# Patient Record
Sex: FEMALE | Race: WHITE | NOT HISPANIC OR LATINO | Employment: UNEMPLOYED | URBAN - METROPOLITAN AREA
[De-identification: names, ages, dates, MRNs, and addresses within clinical notes are randomized per-mention and may not be internally consistent; named-entity substitution may affect disease eponyms.]

---

## 2024-06-07 ENCOUNTER — HOSPITAL ENCOUNTER (INPATIENT)
Facility: HOSPITAL | Age: 54
LOS: 2 days | Discharge: LEFT BEFORE DISCHARGE | DRG: 371 | End: 2024-06-09
Attending: INTERNAL MEDICINE | Admitting: INTERNAL MEDICINE
Payer: COMMERCIAL

## 2024-06-07 PROBLEM — N15.1 PERINEPHRIC ABSCESS: Status: ACTIVE | Noted: 2024-06-07

## 2024-06-07 PROBLEM — N39.0 UTI (URINARY TRACT INFECTION): Status: ACTIVE | Noted: 2024-06-07

## 2024-06-07 PROBLEM — E86.0 DEHYDRATION: Status: ACTIVE | Noted: 2024-06-07

## 2024-06-07 PROBLEM — R19.7 DIARRHEA: Status: ACTIVE | Noted: 2024-06-07

## 2024-06-07 PROBLEM — E11.9 DIABETES MELLITUS, NEW ONSET: Status: ACTIVE | Noted: 2024-06-07

## 2024-06-07 PROBLEM — E87.6 HYPOKALEMIA: Status: ACTIVE | Noted: 2024-06-07

## 2024-06-07 LAB
ALBUMIN SERPL-MCNC: 3.1 G/DL (ref 3.5–5.2)
ALBUMIN/GLOB SERPL: 0.7 G/DL
ALP SERPL-CCNC: 118 U/L (ref 39–117)
ALT SERPL W P-5'-P-CCNC: 11 U/L (ref 1–33)
ANION GAP SERPL CALCULATED.3IONS-SCNC: 18.6 MMOL/L (ref 5–15)
AST SERPL-CCNC: 14 U/L (ref 1–32)
BACTERIA UR QL AUTO: ABNORMAL /HPF
BASOPHILS # BLD AUTO: 0.07 10*3/MM3 (ref 0–0.2)
BASOPHILS NFR BLD AUTO: 0.5 % (ref 0–1.5)
BILIRUB SERPL-MCNC: 0.4 MG/DL (ref 0–1.2)
BILIRUB UR QL STRIP: NEGATIVE
BUN SERPL-MCNC: 6 MG/DL (ref 6–20)
BUN/CREAT SERPL: 9.2 (ref 7–25)
CALCIUM SPEC-SCNC: 8.8 MG/DL (ref 8.6–10.5)
CHLORIDE SERPL-SCNC: 96 MMOL/L (ref 98–107)
CLARITY UR: CLEAR
CO2 SERPL-SCNC: 22.4 MMOL/L (ref 22–29)
COLOR UR: YELLOW
CREAT SERPL-MCNC: 0.65 MG/DL (ref 0.57–1)
D-LACTATE SERPL-SCNC: 1.6 MMOL/L (ref 0.5–2)
DEPRECATED RDW RBC AUTO: 36.5 FL (ref 37–54)
EGFRCR SERPLBLD CKD-EPI 2021: 103.5 ML/MIN/1.73
EOSINOPHIL # BLD AUTO: 0.11 10*3/MM3 (ref 0–0.4)
EOSINOPHIL NFR BLD AUTO: 0.7 % (ref 0.3–6.2)
ERYTHROCYTE [DISTWIDTH] IN BLOOD BY AUTOMATED COUNT: 11.5 % (ref 12.3–15.4)
GLOBULIN UR ELPH-MCNC: 4.6 GM/DL
GLUCOSE BLDC GLUCOMTR-MCNC: 234 MG/DL (ref 70–130)
GLUCOSE BLDC GLUCOMTR-MCNC: 303 MG/DL (ref 70–130)
GLUCOSE BLDC GLUCOMTR-MCNC: 314 MG/DL (ref 70–130)
GLUCOSE SERPL-MCNC: 282 MG/DL (ref 65–99)
GLUCOSE UR STRIP-MCNC: ABNORMAL MG/DL
HBA1C MFR BLD: 14.4 % (ref 4.8–5.6)
HCT VFR BLD AUTO: 38.8 % (ref 34–46.6)
HGB BLD-MCNC: 12.4 G/DL (ref 12–15.9)
HGB UR QL STRIP.AUTO: ABNORMAL
HYALINE CASTS UR QL AUTO: ABNORMAL /LPF
IMM GRANULOCYTES # BLD AUTO: 0.11 10*3/MM3 (ref 0–0.05)
IMM GRANULOCYTES NFR BLD AUTO: 0.7 % (ref 0–0.5)
KETONES UR QL STRIP: ABNORMAL
LEUKOCYTE ESTERASE UR QL STRIP.AUTO: ABNORMAL
LYMPHOCYTES # BLD AUTO: 1.74 10*3/MM3 (ref 0.7–3.1)
LYMPHOCYTES NFR BLD AUTO: 11.3 % (ref 19.6–45.3)
MAGNESIUM SERPL-MCNC: 1.8 MG/DL (ref 1.6–2.6)
MCH RBC QN AUTO: 27.7 PG (ref 26.6–33)
MCHC RBC AUTO-ENTMCNC: 32 G/DL (ref 31.5–35.7)
MCV RBC AUTO: 86.8 FL (ref 79–97)
MONOCYTES # BLD AUTO: 0.75 10*3/MM3 (ref 0.1–0.9)
MONOCYTES NFR BLD AUTO: 4.9 % (ref 5–12)
NEUTROPHILS NFR BLD AUTO: 12.59 10*3/MM3 (ref 1.7–7)
NEUTROPHILS NFR BLD AUTO: 81.9 % (ref 42.7–76)
NITRITE UR QL STRIP: NEGATIVE
NRBC BLD AUTO-RTO: 0 /100 WBC (ref 0–0.2)
PH UR STRIP.AUTO: 6.5 [PH] (ref 5–8)
PLATELET # BLD AUTO: 451 10*3/MM3 (ref 140–450)
PMV BLD AUTO: 10.8 FL (ref 6–12)
POTASSIUM SERPL-SCNC: 2.7 MMOL/L (ref 3.5–5.2)
PROT SERPL-MCNC: 7.7 G/DL (ref 6–8.5)
PROT UR QL STRIP: ABNORMAL
RBC # BLD AUTO: 4.47 10*6/MM3 (ref 3.77–5.28)
RBC # UR STRIP: ABNORMAL /HPF
REF LAB TEST METHOD: ABNORMAL
SODIUM SERPL-SCNC: 137 MMOL/L (ref 136–145)
SP GR UR STRIP: 1.01 (ref 1–1.03)
SQUAMOUS #/AREA URNS HPF: ABNORMAL /HPF
UROBILINOGEN UR QL STRIP: ABNORMAL
WBC # UR STRIP: ABNORMAL /HPF
WBC NRBC COR # BLD AUTO: 15.37 10*3/MM3 (ref 3.4–10.8)

## 2024-06-07 PROCEDURE — 87086 URINE CULTURE/COLONY COUNT: CPT | Performed by: INTERNAL MEDICINE

## 2024-06-07 PROCEDURE — 81001 URINALYSIS AUTO W/SCOPE: CPT | Performed by: INTERNAL MEDICINE

## 2024-06-07 PROCEDURE — 85025 COMPLETE CBC W/AUTO DIFF WBC: CPT | Performed by: INTERNAL MEDICINE

## 2024-06-07 PROCEDURE — 83605 ASSAY OF LACTIC ACID: CPT | Performed by: INTERNAL MEDICINE

## 2024-06-07 PROCEDURE — 82948 REAGENT STRIP/BLOOD GLUCOSE: CPT

## 2024-06-07 PROCEDURE — 25010000002 CEFTRIAXONE PER 250 MG: Performed by: INTERNAL MEDICINE

## 2024-06-07 PROCEDURE — 80053 COMPREHEN METABOLIC PANEL: CPT | Performed by: INTERNAL MEDICINE

## 2024-06-07 PROCEDURE — 83735 ASSAY OF MAGNESIUM: CPT | Performed by: INTERNAL MEDICINE

## 2024-06-07 PROCEDURE — 83036 HEMOGLOBIN GLYCOSYLATED A1C: CPT | Performed by: INTERNAL MEDICINE

## 2024-06-07 PROCEDURE — 63710000001 INSULIN GLARGINE PER 5 UNITS: Performed by: INTERNAL MEDICINE

## 2024-06-07 PROCEDURE — 63710000001 INSULIN LISPRO (HUMAN) PER 5 UNITS: Performed by: INTERNAL MEDICINE

## 2024-06-07 PROCEDURE — 25010000002 SODIUM CHLORIDE 0.9 % WITH KCL 20 MEQ 20-0.9 MEQ/L-% SOLUTION: Performed by: INTERNAL MEDICINE

## 2024-06-07 PROCEDURE — 87040 BLOOD CULTURE FOR BACTERIA: CPT | Performed by: INTERNAL MEDICINE

## 2024-06-07 RX ORDER — NALOXONE HCL 0.4 MG/ML
0.4 VIAL (ML) INJECTION
Status: DISCONTINUED | OUTPATIENT
Start: 2024-06-07 | End: 2024-06-09 | Stop reason: HOSPADM

## 2024-06-07 RX ORDER — ACETAMINOPHEN 650 MG/1
650 SUPPOSITORY RECTAL EVERY 4 HOURS PRN
Status: DISCONTINUED | OUTPATIENT
Start: 2024-06-07 | End: 2024-06-09 | Stop reason: HOSPADM

## 2024-06-07 RX ORDER — INSULIN LISPRO 100 [IU]/ML
2-9 INJECTION, SOLUTION INTRAVENOUS; SUBCUTANEOUS
Status: DISCONTINUED | OUTPATIENT
Start: 2024-06-07 | End: 2024-06-09 | Stop reason: HOSPADM

## 2024-06-07 RX ORDER — NITROGLYCERIN 0.4 MG/1
0.4 TABLET SUBLINGUAL
Status: DISCONTINUED | OUTPATIENT
Start: 2024-06-07 | End: 2024-06-09 | Stop reason: HOSPADM

## 2024-06-07 RX ORDER — BISACODYL 10 MG
10 SUPPOSITORY, RECTAL RECTAL DAILY PRN
Status: DISCONTINUED | OUTPATIENT
Start: 2024-06-07 | End: 2024-06-09 | Stop reason: HOSPADM

## 2024-06-07 RX ORDER — SODIUM CHLORIDE 0.9 % (FLUSH) 0.9 %
10 SYRINGE (ML) INJECTION EVERY 12 HOURS SCHEDULED
Status: DISCONTINUED | OUTPATIENT
Start: 2024-06-07 | End: 2024-06-09 | Stop reason: HOSPADM

## 2024-06-07 RX ORDER — DEXTROSE MONOHYDRATE 25 G/50ML
25 INJECTION, SOLUTION INTRAVENOUS
Status: DISCONTINUED | OUTPATIENT
Start: 2024-06-07 | End: 2024-06-09 | Stop reason: HOSPADM

## 2024-06-07 RX ORDER — ACETAMINOPHEN 325 MG/1
650 TABLET ORAL EVERY 4 HOURS PRN
Status: DISCONTINUED | OUTPATIENT
Start: 2024-06-07 | End: 2024-06-09 | Stop reason: HOSPADM

## 2024-06-07 RX ORDER — MORPHINE SULFATE 2 MG/ML
2 INJECTION, SOLUTION INTRAMUSCULAR; INTRAVENOUS EVERY 4 HOURS PRN
Status: DISCONTINUED | OUTPATIENT
Start: 2024-06-07 | End: 2024-06-09 | Stop reason: HOSPADM

## 2024-06-07 RX ORDER — AMOXICILLIN 250 MG
2 CAPSULE ORAL 2 TIMES DAILY PRN
Status: DISCONTINUED | OUTPATIENT
Start: 2024-06-07 | End: 2024-06-09 | Stop reason: HOSPADM

## 2024-06-07 RX ORDER — SODIUM CHLORIDE 0.9 % (FLUSH) 0.9 %
10 SYRINGE (ML) INJECTION AS NEEDED
Status: DISCONTINUED | OUTPATIENT
Start: 2024-06-07 | End: 2024-06-09 | Stop reason: HOSPADM

## 2024-06-07 RX ORDER — BISACODYL 5 MG/1
5 TABLET, DELAYED RELEASE ORAL DAILY PRN
Status: DISCONTINUED | OUTPATIENT
Start: 2024-06-07 | End: 2024-06-09 | Stop reason: HOSPADM

## 2024-06-07 RX ORDER — NICOTINE POLACRILEX 4 MG
15 LOZENGE BUCCAL
Status: DISCONTINUED | OUTPATIENT
Start: 2024-06-07 | End: 2024-06-09 | Stop reason: HOSPADM

## 2024-06-07 RX ORDER — SODIUM CHLORIDE AND POTASSIUM CHLORIDE 150; 900 MG/100ML; MG/100ML
75 INJECTION, SOLUTION INTRAVENOUS CONTINUOUS
Status: DISCONTINUED | OUTPATIENT
Start: 2024-06-07 | End: 2024-06-09 | Stop reason: HOSPADM

## 2024-06-07 RX ORDER — SODIUM CHLORIDE 9 MG/ML
40 INJECTION, SOLUTION INTRAVENOUS AS NEEDED
Status: DISCONTINUED | OUTPATIENT
Start: 2024-06-07 | End: 2024-06-09 | Stop reason: HOSPADM

## 2024-06-07 RX ORDER — IBUPROFEN 600 MG/1
1 TABLET ORAL
Status: DISCONTINUED | OUTPATIENT
Start: 2024-06-07 | End: 2024-06-09 | Stop reason: HOSPADM

## 2024-06-07 RX ORDER — INSULIN LISPRO 100 [IU]/ML
5 INJECTION, SOLUTION INTRAVENOUS; SUBCUTANEOUS
Status: DISCONTINUED | OUTPATIENT
Start: 2024-06-07 | End: 2024-06-09 | Stop reason: HOSPADM

## 2024-06-07 RX ORDER — POLYETHYLENE GLYCOL 3350 17 G/17G
17 POWDER, FOR SOLUTION ORAL DAILY PRN
Status: DISCONTINUED | OUTPATIENT
Start: 2024-06-07 | End: 2024-06-09 | Stop reason: HOSPADM

## 2024-06-07 RX ORDER — ACETAMINOPHEN 160 MG/5ML
650 SOLUTION ORAL EVERY 4 HOURS PRN
Status: DISCONTINUED | OUTPATIENT
Start: 2024-06-07 | End: 2024-06-09 | Stop reason: HOSPADM

## 2024-06-07 RX ORDER — ACETAMINOPHEN 325 MG/1
650 TABLET ORAL EVERY 4 HOURS PRN
COMMUNITY

## 2024-06-07 RX ORDER — POTASSIUM CHLORIDE 750 MG/1
40 TABLET, FILM COATED, EXTENDED RELEASE ORAL EVERY 4 HOURS
Status: COMPLETED | OUTPATIENT
Start: 2024-06-07 | End: 2024-06-08

## 2024-06-07 RX ORDER — ONDANSETRON 2 MG/ML
4 INJECTION INTRAMUSCULAR; INTRAVENOUS EVERY 6 HOURS PRN
Status: DISCONTINUED | OUTPATIENT
Start: 2024-06-07 | End: 2024-06-09 | Stop reason: HOSPADM

## 2024-06-07 RX ADMIN — CEFTRIAXONE SODIUM 1000 MG: 1 INJECTION, POWDER, FOR SOLUTION INTRAMUSCULAR; INTRAVENOUS at 17:44

## 2024-06-07 RX ADMIN — POTASSIUM CHLORIDE AND SODIUM CHLORIDE 125 ML/HR: 900; 150 INJECTION, SOLUTION INTRAVENOUS at 17:05

## 2024-06-07 RX ADMIN — INSULIN LISPRO 7 UNITS: 100 INJECTION, SOLUTION INTRAVENOUS; SUBCUTANEOUS at 17:44

## 2024-06-07 RX ADMIN — Medication 10 ML: at 21:50

## 2024-06-07 RX ADMIN — POTASSIUM CHLORIDE 40 MEQ: 750 TABLET, EXTENDED RELEASE ORAL at 21:50

## 2024-06-07 RX ADMIN — INSULIN LISPRO 4 UNITS: 100 INJECTION, SOLUTION INTRAVENOUS; SUBCUTANEOUS at 21:49

## 2024-06-07 RX ADMIN — INSULIN LISPRO 5 UNITS: 100 INJECTION, SOLUTION INTRAVENOUS; SUBCUTANEOUS at 17:44

## 2024-06-07 RX ADMIN — INSULIN GLARGINE 25 UNITS: 100 INJECTION, SOLUTION SUBCUTANEOUS at 17:43

## 2024-06-07 NOTE — H&P
Internal medicine history and physical  INTERNAL MEDICINE   [facility]       Patient Identification:  Name: Nia SCALES  Age: 54 y.o.  Sex: female  :  1970  MRN: [id number]                   Primary Care Physician: Provider, No Known                               Date of admission:2024    Chief Complaint: Presented to the outside facility emergency room with 1 week history of nausea and vomiting and right-sided flank pain.    History of Present Illness:   Patient is a 54-year-old female who was otherwise healthy and does not take any medications started noticing about a month ago that she was getting increasingly thirsty and urinating a lot and progressively getting weak.  In this background a week prior to her visit to the emergency room at outside facility last night patient started having discomfort in the right side of her abdomen and back associated with increased urination and nausea and vomiting and diarrhea.  Her symptoms got worse couple days before going to the emergency room where evaluation revealed abnormal urinalysis, hyperglycemia-blood sugar of 528, low sodium level of 130 and bicarb of 26.  She was noted to have white blood cell count of 18,000 and was noted to have abnormal urinalysis consistent with UTI.  CT scan of the abdomen and pelvis showed right-sided nephric/perinephric abscess.  Patient was accepted to our facility last night for further management of hyperglycemia and hypoglycemia and perinephric abscess and UTI.  Patient arrived to our facility later this afternoon.  Patient was admitted and accepted by the team and was transferred her care to me because of the timing of her arrival and the start of my swing shift.  Patient herself feels about the same in terms of discomfort in her back nausea and feeling thirsty.  Patient denies any anyone cells having any of the symptoms at home.  Patient was brought to the emergency room at the outside facility by  her  in the private vehicle.  Patient admits to smoke about a pack per day for quite some time but denies any drinking.      Past Medical History:  History reviewed. No pertinent past medical history.  Past Surgical History:  History reviewed. No pertinent surgical history.   Home Meds:  Medications Prior to Admission   Medication Sig Dispense Refill Last Dose    acetaminophen (TYLENOL) 325 MG tablet Take 2 tablets by mouth Every 4 (Four) Hours As Needed for Mild Pain.        Current Meds:   No current facility-administered medications for this encounter.  Allergies:  No Known Allergies  Social History:   Social History     Tobacco Use    Smoking status: Every Day     Current packs/day: 1.00     Average packs/day: 1 pack/day for 42.4 years (42.4 ttl pk-yrs)     Types: Cigarettes     Start date: 1982    Smokeless tobacco: Never   Substance Use Topics    Alcohol use: Never      Family History:  Family History   Problem Relation Age of Onset    Cancer Mother     Alcohol abuse Mother           Review of Systems  See history of present illness and past medical history.   As described in the history of presenting illness.      Vitals:   /80 (BP Location: Left arm, Patient Position: Sitting)   Temp 98.1 °F (36.7 °C) (Oral)   Resp 18   I/O: No intake or output data in the 24 hours ending 06/07/24 1648  Exam:  Patient is examined using the personal protective equipment as per guidelines from infection control for this particular patient as enacted.  Hand washing was performed before and after patient interaction.  General Appearance:  Pleasant morbidly obese female who is comfortable and does not appear to be in any acute distress   Head:    Normocephalic, without obvious abnormality, atraumatic   Eyes:    PERRL, conjunctiva/corneas clear, EOM's intact, both eyes   Ears:    Normal external ear canals, both ears   Nose:   Nares normal, septum midline, mucosa normal, no drainage    or sinus tenderness   Throat:    Lips, tongue, gums normal; oral mucosa pink and moist   Neck:   Supple, symmetrical, trachea midline, no adenopathy;     thyroid:  no enlargement/tenderness/nodules; no carotid    bruit or JVD   Back:     Symmetric, no curvature, ROM normal, right CVA tenderness   Lungs:     Clear to auscultation bilaterally, respirations unlabored   Chest Wall:    No tenderness or deformity    Heart:    Regular rate and rhythm, S1 and S2 normal, no murmur, rub   or gallop   Abdomen:   Obese right CVA tenderness   Extremities:   Extremities normal, atraumatic, no cyanosis or edema   Pulses:   Pulses palpable in all extremities; symmetric all extremities   Skin: No rash noted   Neurologic: Alert and oriented and grossly nonfocal       Data Review:      I reviewed the patient's new clinical results.  Lab data at the outside facility reviewed  Active Hospital Problems    Diagnosis  POA    Diabetes mellitus, new onset [E11.9]  Unknown    Hypokalemia [E87.6]  Unknown    UTI (urinary tract infection) [N39.0]  Unknown    Perinephric abscess [N15.1]  Unknown    Dehydration [E86.0]  Unknown    Diarrhea [R19.7]  Unknown       Medical decision making/care plan: See admitting orders  New onset diabetes with hyperosmolar state with associated metabolic derangements consisting of hyponatremia and hypokalemia with dehydration-plan is to admit the patient start her on scheduled insulin regimen consisting of long-acting and Premeal insulin with sliding scale coverage along with aggressive hydration and electrolyte replacement per protocol while monitoring her for any evolving ketoacidosis.  May benefit from diabetic education.  Check hemoglobin A1c.  Right flank tenderness with reported radiographic evidence of right nephric/perinephric abscess with leukocytosis and abnormal urinalysis-plan is to continue with IV Rocephin, blood cultures and urine culture and follow-up on the studies performed at the outside facility.  Morbid obesity-nutrition  evaluation and education by diet and exercise to manage her diabetes.    Michael Jane MD   6/7/2024  16:48 EDT    Parts of this note may be an electronic transcription/translation of spoken language to printed text using the Dragon dictation system.

## 2024-06-08 ENCOUNTER — APPOINTMENT (OUTPATIENT)
Dept: MRI IMAGING | Facility: HOSPITAL | Age: 54
DRG: 371 | End: 2024-06-08
Payer: COMMERCIAL

## 2024-06-08 LAB
ALBUMIN SERPL-MCNC: 2.4 G/DL (ref 3.5–5.2)
ALBUMIN/GLOB SERPL: 0.6 G/DL
ALP SERPL-CCNC: 110 U/L (ref 39–117)
ALT SERPL W P-5'-P-CCNC: 15 U/L (ref 1–33)
ANION GAP SERPL CALCULATED.3IONS-SCNC: 13.8 MMOL/L (ref 5–15)
AST SERPL-CCNC: 15 U/L (ref 1–32)
BACTERIA SPEC AEROBE CULT: NORMAL
BASOPHILS # BLD AUTO: 0.05 10*3/MM3 (ref 0–0.2)
BASOPHILS NFR BLD AUTO: 0.3 % (ref 0–1.5)
BILIRUB SERPL-MCNC: 0.3 MG/DL (ref 0–1.2)
BUN SERPL-MCNC: 4 MG/DL (ref 6–20)
BUN/CREAT SERPL: 8.3 (ref 7–25)
CALCIUM SPEC-SCNC: 8 MG/DL (ref 8.6–10.5)
CHLORIDE SERPL-SCNC: 102 MMOL/L (ref 98–107)
CO2 SERPL-SCNC: 22.2 MMOL/L (ref 22–29)
CREAT SERPL-MCNC: 0.48 MG/DL (ref 0.57–1)
D-LACTATE SERPL-SCNC: 0.9 MMOL/L (ref 0.5–2)
DEPRECATED RDW RBC AUTO: 35.6 FL (ref 37–54)
EGFRCR SERPLBLD CKD-EPI 2021: 111.3 ML/MIN/1.73
EOSINOPHIL # BLD AUTO: 0.11 10*3/MM3 (ref 0–0.4)
EOSINOPHIL NFR BLD AUTO: 0.7 % (ref 0.3–6.2)
ERYTHROCYTE [DISTWIDTH] IN BLOOD BY AUTOMATED COUNT: 11.6 % (ref 12.3–15.4)
GLOBULIN UR ELPH-MCNC: 3.8 GM/DL
GLUCOSE BLDC GLUCOMTR-MCNC: 160 MG/DL (ref 70–130)
GLUCOSE BLDC GLUCOMTR-MCNC: 176 MG/DL (ref 70–130)
GLUCOSE BLDC GLUCOMTR-MCNC: 259 MG/DL (ref 70–130)
GLUCOSE BLDC GLUCOMTR-MCNC: 277 MG/DL (ref 70–130)
GLUCOSE SERPL-MCNC: 265 MG/DL (ref 65–99)
HCT VFR BLD AUTO: 32.7 % (ref 34–46.6)
HGB BLD-MCNC: 10.7 G/DL (ref 12–15.9)
IMM GRANULOCYTES # BLD AUTO: 0.11 10*3/MM3 (ref 0–0.05)
IMM GRANULOCYTES NFR BLD AUTO: 0.7 % (ref 0–0.5)
LYMPHOCYTES # BLD AUTO: 1.96 10*3/MM3 (ref 0.7–3.1)
LYMPHOCYTES NFR BLD AUTO: 11.8 % (ref 19.6–45.3)
MAGNESIUM SERPL-MCNC: 1.6 MG/DL (ref 1.6–2.6)
MCH RBC QN AUTO: 27.9 PG (ref 26.6–33)
MCHC RBC AUTO-ENTMCNC: 32.7 G/DL (ref 31.5–35.7)
MCV RBC AUTO: 85.4 FL (ref 79–97)
MONOCYTES # BLD AUTO: 0.98 10*3/MM3 (ref 0.1–0.9)
MONOCYTES NFR BLD AUTO: 5.9 % (ref 5–12)
NEUTROPHILS NFR BLD AUTO: 13.37 10*3/MM3 (ref 1.7–7)
NEUTROPHILS NFR BLD AUTO: 80.6 % (ref 42.7–76)
NRBC BLD AUTO-RTO: 0 /100 WBC (ref 0–0.2)
PLATELET # BLD AUTO: 404 10*3/MM3 (ref 140–450)
PMV BLD AUTO: 10.4 FL (ref 6–12)
POTASSIUM SERPL-SCNC: 2.7 MMOL/L (ref 3.5–5.2)
POTASSIUM SERPL-SCNC: 2.9 MMOL/L (ref 3.5–5.2)
POTASSIUM SERPL-SCNC: 3.5 MMOL/L (ref 3.5–5.2)
PROT SERPL-MCNC: 6.2 G/DL (ref 6–8.5)
RBC # BLD AUTO: 3.83 10*6/MM3 (ref 3.77–5.28)
SODIUM SERPL-SCNC: 138 MMOL/L (ref 136–145)
WBC NRBC COR # BLD AUTO: 16.58 10*3/MM3 (ref 3.4–10.8)

## 2024-06-08 PROCEDURE — 85025 COMPLETE CBC W/AUTO DIFF WBC: CPT | Performed by: INTERNAL MEDICINE

## 2024-06-08 PROCEDURE — 80053 COMPREHEN METABOLIC PANEL: CPT | Performed by: INTERNAL MEDICINE

## 2024-06-08 PROCEDURE — 25010000002 SODIUM CHLORIDE 0.9 % WITH KCL 20 MEQ 20-0.9 MEQ/L-% SOLUTION: Performed by: HOSPITALIST

## 2024-06-08 PROCEDURE — 82948 REAGENT STRIP/BLOOD GLUCOSE: CPT

## 2024-06-08 PROCEDURE — 83735 ASSAY OF MAGNESIUM: CPT | Performed by: INTERNAL MEDICINE

## 2024-06-08 PROCEDURE — 25010000002 SODIUM CHLORIDE 0.9 % WITH KCL 20 MEQ 20-0.9 MEQ/L-% SOLUTION: Performed by: INTERNAL MEDICINE

## 2024-06-08 PROCEDURE — 84132 ASSAY OF SERUM POTASSIUM: CPT | Performed by: INTERNAL MEDICINE

## 2024-06-08 PROCEDURE — 63710000001 INSULIN GLARGINE PER 5 UNITS: Performed by: INTERNAL MEDICINE

## 2024-06-08 PROCEDURE — 25010000002 CEFTRIAXONE PER 250 MG: Performed by: HOSPITALIST

## 2024-06-08 PROCEDURE — 63710000001 INSULIN LISPRO (HUMAN) PER 5 UNITS: Performed by: INTERNAL MEDICINE

## 2024-06-08 PROCEDURE — 83605 ASSAY OF LACTIC ACID: CPT | Performed by: INTERNAL MEDICINE

## 2024-06-08 RX ORDER — POTASSIUM CHLORIDE 750 MG/1
40 TABLET, FILM COATED, EXTENDED RELEASE ORAL EVERY 4 HOURS
Status: COMPLETED | OUTPATIENT
Start: 2024-06-08 | End: 2024-06-09

## 2024-06-08 RX ADMIN — CEFTRIAXONE 2000 MG: 2 INJECTION, POWDER, FOR SOLUTION INTRAMUSCULAR; INTRAVENOUS at 17:29

## 2024-06-08 RX ADMIN — INSULIN LISPRO 6 UNITS: 100 INJECTION, SOLUTION INTRAVENOUS; SUBCUTANEOUS at 08:27

## 2024-06-08 RX ADMIN — INSULIN LISPRO 6 UNITS: 100 INJECTION, SOLUTION INTRAVENOUS; SUBCUTANEOUS at 17:29

## 2024-06-08 RX ADMIN — POTASSIUM CHLORIDE AND SODIUM CHLORIDE 75 ML/HR: 900; 150 INJECTION, SOLUTION INTRAVENOUS at 23:40

## 2024-06-08 RX ADMIN — ACETAMINOPHEN 325MG 650 MG: 325 TABLET ORAL at 04:10

## 2024-06-08 RX ADMIN — POTASSIUM CHLORIDE 40 MEQ: 750 TABLET, EXTENDED RELEASE ORAL at 23:35

## 2024-06-08 RX ADMIN — POTASSIUM CHLORIDE 40 MEQ: 750 TABLET, EXTENDED RELEASE ORAL at 02:03

## 2024-06-08 RX ADMIN — INSULIN LISPRO 2 UNITS: 100 INJECTION, SOLUTION INTRAVENOUS; SUBCUTANEOUS at 20:11

## 2024-06-08 RX ADMIN — POTASSIUM CHLORIDE 40 MEQ: 750 TABLET, EXTENDED RELEASE ORAL at 19:59

## 2024-06-08 RX ADMIN — Medication 10 ML: at 20:02

## 2024-06-08 RX ADMIN — POTASSIUM CHLORIDE AND SODIUM CHLORIDE 75 ML/HR: 900; 150 INJECTION, SOLUTION INTRAVENOUS at 09:48

## 2024-06-08 RX ADMIN — POTASSIUM CHLORIDE AND SODIUM CHLORIDE 125 ML/HR: 900; 150 INJECTION, SOLUTION INTRAVENOUS at 02:03

## 2024-06-08 RX ADMIN — POTASSIUM CHLORIDE 40 MEQ: 750 TABLET, EXTENDED RELEASE ORAL at 05:33

## 2024-06-08 RX ADMIN — INSULIN LISPRO 2 UNITS: 100 INJECTION, SOLUTION INTRAVENOUS; SUBCUTANEOUS at 12:05

## 2024-06-08 RX ADMIN — Medication 10 ML: at 08:27

## 2024-06-08 RX ADMIN — INSULIN LISPRO 5 UNITS: 100 INJECTION, SOLUTION INTRAVENOUS; SUBCUTANEOUS at 08:27

## 2024-06-08 RX ADMIN — INSULIN GLARGINE 25 UNITS: 100 INJECTION, SOLUTION SUBCUTANEOUS at 20:11

## 2024-06-08 RX ADMIN — INSULIN LISPRO 5 UNITS: 100 INJECTION, SOLUTION INTRAVENOUS; SUBCUTANEOUS at 17:29

## 2024-06-08 RX ADMIN — ACETAMINOPHEN 325MG 650 MG: 325 TABLET ORAL at 23:35

## 2024-06-08 RX ADMIN — INSULIN LISPRO 5 UNITS: 100 INJECTION, SOLUTION INTRAVENOUS; SUBCUTANEOUS at 12:05

## 2024-06-08 NOTE — PLAN OF CARE
Goal Outcome Evaluation:  Plan of Care Reviewed With: patient        Progress: no change  Outcome Evaluation: Pt A&Ox4. VSS. IV fluids infusing. K replaced per protocol. Plan of care continues.

## 2024-06-08 NOTE — PROGRESS NOTES
Dedicated to Hospital Care    [phone]   LOS: 1 day     Name: Nia SCALES  Age/Sex: 54 y.o. female  :  1970        PCP: Provider, No Known  No chief complaint on file.     Subjective   Complains of pain in her right hip and low back as well as right flank.  She is requesting to take a shower.  Denies other new issues or complaints this morning.  General: No Fever or Chills, Cardiac: No Chest Pain or Palpitations, Resp: No Cough or SOA, GI: No Nausea, Vomiting, or Diarrhea, and Other: No bleeding    cefTRIAXone, 2,000 mg, Intravenous, Q24H  insulin glargine, 25 Units, Subcutaneous, Nightly  insulin lispro, 2-9 Units, Subcutaneous, 4x Daily AC & at Bedtime  insulin lispro, 5 Units, Subcutaneous, TID With Meals  sodium chloride, 10 mL, Intravenous, Q12H      sodium chloride 0.9 % with KCl 20 mEq, 75 mL/hr, Last Rate: 75 mL/hr (24 0948)        Objective   Vital Signs  Temp:  [97.7 °F (36.5 °C)-100.2 °F (37.9 °C)] 98.2 °F (36.8 °C)  Heart Rate:  [] 92  Resp:  [16-20] 20  BP: (147-156)/(61-80) 147/73  Body mass index is 32.02 kg/m².    Intake/Output Summary (Last 24 hours) at 2024 1246  Last data filed at 2024 1724  Gross per 24 hour   Intake --   Output 100 ml   Net -100 ml       Physical Exam  Vitals and nursing note reviewed.   Constitutional:       General: She is not in acute distress.     Appearance: She is ill-appearing.   Cardiovascular:      Rate and Rhythm: Normal rate and regular rhythm.   Pulmonary:      Effort: No respiratory distress.      Breath sounds: Normal breath sounds.   Neurological:      General: No focal deficit present.      Mental Status: She is alert. Mental status is at baseline.           Results Review:       I reviewed the patient's new clinical results.  Results from last 7 days   Lab Units 24  0537 24  1706   WBC 10*3/mm3 16.58* 15.37*   HEMOGLOBIN g/dL 10.7* 12.4   PLATELETS 10*3/mm3 404 451*     Results from last 7 days   Lab Units  06/08/24  1014 06/08/24  0537 06/07/24  1706   SODIUM mmol/L  --  138 137   POTASSIUM mmol/L 3.5 2.9* 2.7*   CHLORIDE mmol/L  --  102 96*   CO2 mmol/L  --  22.2 22.4   BUN mg/dL  --  4* 6   CREATININE mg/dL  --  0.48* 0.65   CALCIUM mg/dL  --  8.0* 8.8   MAGNESIUM mg/dL  --  1.6 1.8   Estimated Creatinine Clearance: 147.9 mL/min (A) (by C-G formula based on SCr of 0.48 mg/dL (L)).      Assessment & Plan   Active Hospital Problems    Diagnosis  POA    **Perinephric abscess [N15.1]  Unknown    Diabetes mellitus, new onset [E11.9]  Unknown    Hypokalemia [E87.6]  Unknown    UTI (urinary tract infection) [N39.0]  Unknown    Dehydration [E86.0]  Unknown    Diarrhea [R19.7]  Unknown      Resolved Hospital Problems   No resolved problems to display.       PLAN  This is a 54-year-old lady with lack of significant past medical history who presents to the hospital with flank pain and back pain is found to have new onset diabetes as well as possible perinephric abscess  -Urology's been consulted we will follow-up their recommendations and follow-up additional imaging if required.  -Replace potassium per protocol  -Continue IV Rocephin but increase dose to 2 g given the concern for abscess  -Follow-up urine and blood cultures  -Decrease IV fluids  -Okay to shower  -Mechanical DVT prophylaxis  -Full code      Disposition  Expected discharge date/ time has not been documented.       Efren George MD  [facility]  06/08/24  12:46 EDT

## 2024-06-08 NOTE — PLAN OF CARE
Goal Outcome Evaluation:  Plan of Care Reviewed With: patient        Progress: no change  Outcome Evaluation: Pt educated about diabetes, checking blood glucose and complications of the disease; Pt took a shower; MRI form was completed and faxed; Pt IV fluids decreased; IV abx given; New IV placed and other two IVs removed. pt also educated about telemetry unit and a basic plan of care in making sure her BG levels were better controlled.

## 2024-06-09 ENCOUNTER — APPOINTMENT (OUTPATIENT)
Dept: CT IMAGING | Facility: HOSPITAL | Age: 54
DRG: 371 | End: 2024-06-09
Payer: COMMERCIAL

## 2024-06-09 VITALS
RESPIRATION RATE: 22 BRPM | BODY MASS INDEX: 31.89 KG/M2 | DIASTOLIC BLOOD PRESSURE: 68 MMHG | HEART RATE: 90 BPM | OXYGEN SATURATION: 97 % | HEIGHT: 66 IN | TEMPERATURE: 99.1 F | WEIGHT: 198.41 LBS | SYSTOLIC BLOOD PRESSURE: 140 MMHG

## 2024-06-09 LAB
ALBUMIN SERPL-MCNC: 2.5 G/DL (ref 3.5–5.2)
ANION GAP SERPL CALCULATED.3IONS-SCNC: 12.2 MMOL/L (ref 5–15)
BASOPHILS # BLD AUTO: 0.04 10*3/MM3 (ref 0–0.2)
BASOPHILS NFR BLD AUTO: 0.2 % (ref 0–1.5)
BUN SERPL-MCNC: 4 MG/DL (ref 6–20)
BUN/CREAT SERPL: 7.3 (ref 7–25)
CALCIUM SPEC-SCNC: 8 MG/DL (ref 8.6–10.5)
CHLORIDE SERPL-SCNC: 101 MMOL/L (ref 98–107)
CO2 SERPL-SCNC: 21.8 MMOL/L (ref 22–29)
CREAT SERPL-MCNC: 0.55 MG/DL (ref 0.57–1)
DEPRECATED RDW RBC AUTO: 37.1 FL (ref 37–54)
EGFRCR SERPLBLD CKD-EPI 2021: 107.7 ML/MIN/1.73
EOSINOPHIL # BLD AUTO: 0.18 10*3/MM3 (ref 0–0.4)
EOSINOPHIL NFR BLD AUTO: 1.1 % (ref 0.3–6.2)
ERYTHROCYTE [DISTWIDTH] IN BLOOD BY AUTOMATED COUNT: 11.8 % (ref 12.3–15.4)
GLUCOSE BLDC GLUCOMTR-MCNC: 193 MG/DL (ref 70–130)
GLUCOSE BLDC GLUCOMTR-MCNC: 244 MG/DL (ref 70–130)
GLUCOSE SERPL-MCNC: 177 MG/DL (ref 65–99)
HCT VFR BLD AUTO: 31 % (ref 34–46.6)
HGB BLD-MCNC: 10.1 G/DL (ref 12–15.9)
IMM GRANULOCYTES # BLD AUTO: 0.12 10*3/MM3 (ref 0–0.05)
IMM GRANULOCYTES NFR BLD AUTO: 0.7 % (ref 0–0.5)
LYMPHOCYTES # BLD AUTO: 2.05 10*3/MM3 (ref 0.7–3.1)
LYMPHOCYTES NFR BLD AUTO: 12.8 % (ref 19.6–45.3)
MAGNESIUM SERPL-MCNC: 1.4 MG/DL (ref 1.6–2.6)
MCH RBC QN AUTO: 28 PG (ref 26.6–33)
MCHC RBC AUTO-ENTMCNC: 32.6 G/DL (ref 31.5–35.7)
MCV RBC AUTO: 85.9 FL (ref 79–97)
MONOCYTES # BLD AUTO: 0.94 10*3/MM3 (ref 0.1–0.9)
MONOCYTES NFR BLD AUTO: 5.9 % (ref 5–12)
NEUTROPHILS NFR BLD AUTO: 12.7 10*3/MM3 (ref 1.7–7)
NEUTROPHILS NFR BLD AUTO: 79.3 % (ref 42.7–76)
NRBC BLD AUTO-RTO: 0 /100 WBC (ref 0–0.2)
PHOSPHATE SERPL-MCNC: 2.2 MG/DL (ref 2.5–4.5)
PLATELET # BLD AUTO: 332 10*3/MM3 (ref 140–450)
PMV BLD AUTO: 10 FL (ref 6–12)
POTASSIUM SERPL-SCNC: 3.1 MMOL/L (ref 3.5–5.2)
POTASSIUM SERPL-SCNC: 3.1 MMOL/L (ref 3.5–5.2)
POTASSIUM SERPL-SCNC: 3.4 MMOL/L (ref 3.5–5.2)
RBC # BLD AUTO: 3.61 10*6/MM3 (ref 3.77–5.28)
SODIUM SERPL-SCNC: 135 MMOL/L (ref 136–145)
WBC NRBC COR # BLD AUTO: 16.03 10*3/MM3 (ref 3.4–10.8)

## 2024-06-09 PROCEDURE — 85025 COMPLETE CBC W/AUTO DIFF WBC: CPT | Performed by: HOSPITALIST

## 2024-06-09 PROCEDURE — 84132 ASSAY OF SERUM POTASSIUM: CPT | Performed by: HOSPITALIST

## 2024-06-09 PROCEDURE — 74178 CT ABD&PLV WO CNTR FLWD CNTR: CPT

## 2024-06-09 PROCEDURE — 63710000001 INSULIN LISPRO (HUMAN) PER 5 UNITS: Performed by: INTERNAL MEDICINE

## 2024-06-09 PROCEDURE — 82948 REAGENT STRIP/BLOOD GLUCOSE: CPT

## 2024-06-09 PROCEDURE — 25510000001 IOPAMIDOL 61 % SOLUTION: Performed by: HOSPITALIST

## 2024-06-09 PROCEDURE — 25010000002 MAGNESIUM SULFATE 2 GM/50ML SOLUTION: Performed by: HOSPITALIST

## 2024-06-09 PROCEDURE — 99223 1ST HOSP IP/OBS HIGH 75: CPT | Performed by: STUDENT IN AN ORGANIZED HEALTH CARE EDUCATION/TRAINING PROGRAM

## 2024-06-09 PROCEDURE — 80069 RENAL FUNCTION PANEL: CPT | Performed by: HOSPITALIST

## 2024-06-09 PROCEDURE — 83735 ASSAY OF MAGNESIUM: CPT | Performed by: HOSPITALIST

## 2024-06-09 RX ORDER — POTASSIUM CHLORIDE 750 MG/1
40 TABLET, FILM COATED, EXTENDED RELEASE ORAL EVERY 4 HOURS
Status: DISCONTINUED | OUTPATIENT
Start: 2024-06-09 | End: 2024-06-09 | Stop reason: HOSPADM

## 2024-06-09 RX ORDER — MAGNESIUM SULFATE HEPTAHYDRATE 40 MG/ML
2 INJECTION, SOLUTION INTRAVENOUS
Status: DISPENSED | OUTPATIENT
Start: 2024-06-09 | End: 2024-06-09

## 2024-06-09 RX ADMIN — POTASSIUM, SODIUM PHOSPHATES 280 MG-160 MG-250 MG ORAL POWDER PACKET 2 PACKET: POWDER IN PACKET at 08:07

## 2024-06-09 RX ADMIN — MAGNESIUM SULFATE HEPTAHYDRATE 2 G: 2 INJECTION, SOLUTION INTRAVENOUS at 10:52

## 2024-06-09 RX ADMIN — POTASSIUM CHLORIDE 40 MEQ: 750 TABLET, EXTENDED RELEASE ORAL at 10:52

## 2024-06-09 RX ADMIN — POTASSIUM CHLORIDE 40 MEQ: 750 TABLET, EXTENDED RELEASE ORAL at 03:34

## 2024-06-09 RX ADMIN — INSULIN LISPRO 2 UNITS: 100 INJECTION, SOLUTION INTRAVENOUS; SUBCUTANEOUS at 08:06

## 2024-06-09 RX ADMIN — INSULIN LISPRO 5 UNITS: 100 INJECTION, SOLUTION INTRAVENOUS; SUBCUTANEOUS at 08:07

## 2024-06-09 RX ADMIN — IOPAMIDOL 85 ML: 612 INJECTION, SOLUTION INTRAVENOUS at 08:27

## 2024-06-09 RX ADMIN — MAGNESIUM SULFATE HEPTAHYDRATE 2 G: 2 INJECTION, SOLUTION INTRAVENOUS at 08:07

## 2024-06-09 NOTE — NURSING NOTE
Dr. Bird called to speak to this nurse regarding need for gen-surg consult and if CT abd/pelvis with and without contrast, he had ordered, would be done tonight. I spoke with RAGHAV NAVA, with [facility], and explained Dr. Bird's recommendation and gen-surg consult was ordered. I then spoke with the CT department and expressed Dr. Bird's concerns and was assured that the pt's CT would be done overnight.

## 2024-06-09 NOTE — SIGNIFICANT NOTE
06/09/24 0913   OTHER   Discipline physical therapist   Rehab Time/Intention   Session Not Performed other (see comments)  (PT eval order received, nsg notes patient up ad viri in room without difficulty, took a shower, no indication for acute skilled PT)

## 2024-06-09 NOTE — CONSULTS
FIRST UROLOGY CONSULT      Patient Identification:  NAME:  Nia SCALES  Age:  54 y.o.   Sex:  female   :  1970   MRN:  [id number]     Chief complaint: Right flank pain, nausea and vomiting    History of present illness:      Pt is a 54 y.o. female that presented to [facility] as a direct admit on 24 for further management of her diabetes, perinephric abscess and UTI. Patient had been seen at an outside facility x 1 week ago with complaints of right sided flank pain, urinary frequency, nausea and vomiting. CT scan at outside facility showed a right nephric/perinephric abscess. Urology consulted for evaluation and management of a possible perinephric abscess. Pt denies dysuria, gross hematuria, current fever, nausea or vomiting. Reports right flank pain and urinary frequency. General surgery and ID following. General surgery recommending IR evaluation for drainage of abscess.    In hospital:  -Afebrile currently, temp 100.2-101.1 on admission, good UOP  -WBC - 16.03 (16.58)  -Creat - 0.55 (0.48)  -UA - Trace LE, negative nitrites, 3-5 RBC, 21-50 WBC, no bacteria 3-6 SE cells  -UCx - Mixed chinyere  -Blood culture - No growth    -CT - Report from outside facility - Right kidney displaced anteriorly by the retroperitoneal process. Posterior margin of the right kidney upper to interpolar indistinct with the adjacent complex collection, at least a very small component appears to be perirenal, contiguous with the psoas compartment component. Subtle heterogeneous decreased cortical enhancement of the adjacent right kidney in this region but otherwise the right kidney appears unremarkable. Left kidney unremarkable. No hydronephrosis. Small calculi in both kidneys. Complex predominantly low attenuation collection or mass in the right psoas muscle from the upper pole kidney level to the level of the iliac crest, 7.8 x 5.6 cm axial by 11.7 cm cranial caudal with partial peripheral enhancement and septations. Mild  adjacent stranding. No obvious extension into the spinal canal.       CT - Bilateral non-obstructing nephrolithiasis, large fluid collection centered in the right psoas extending into and displacing right kidney    Asked to see    Past medical history:  History reviewed. No pertinent past medical history.    Past surgical history:  History reviewed. No pertinent surgical history.    Allergies:  Patient has no known allergies.    Home medications:  Medications Prior to Admission   Medication Sig Dispense Refill Last Dose    acetaminophen (TYLENOL) 325 MG tablet Take 2 tablets by mouth Every 4 (Four) Hours As Needed for Mild Pain.           Hospital medications:  cefTRIAXone, 2,000 mg, Intravenous, Q24H  insulin glargine, 25 Units, Subcutaneous, Nightly  insulin lispro, 2-9 Units, Subcutaneous, 4x Daily AC & at Bedtime  insulin lispro, 5 Units, Subcutaneous, TID With Meals  magnesium sulfate, 2 g, Intravenous, Q2H  potassium & sodium phosphates, 2 packet, Oral, Once  sodium chloride, 10 mL, Intravenous, Q12H      sodium chloride 0.9 % with KCl 20 mEq, 75 mL/hr, Last Rate: 75 mL/hr (06/08/24 2340)        acetaminophen **OR** acetaminophen **OR** acetaminophen    senna-docusate sodium **AND** polyethylene glycol **AND** bisacodyl **AND** bisacodyl    Calcium Replacement - Follow Nurse / BPA Driven Protocol    dextrose    dextrose    glucagon (human recombinant)    Magnesium Standard Dose Replacement - Follow Nurse / BPA Driven Protocol    Morphine **AND** naloxone    nitroglycerin    ondansetron    Phosphorus Replacement - Follow Nurse / BPA Driven Protocol    Potassium Replacement - Follow Nurse / BPA Driven Protocol    sodium chloride    sodium chloride    Family history:  Family History   Problem Relation Age of Onset    Cancer Mother     Alcohol abuse Mother        Social history:  Social History     Tobacco Use    Smoking status: Every Day     Current packs/day: 1.00     Average packs/day: 1 pack/day for 42.4  years (42.4 ttl pk-yrs)     Types: Cigarettes     Start date:     Smokeless tobacco: Never   Vaping Use    Vaping status: Never Used   Substance Use Topics    Alcohol use: Never    Drug use: Never       Review of systems:      12 point negative except as in HPI    Objective:  TMax 24 hours:   Temp (24hrs), Av.5 °F (37.5 °C), Min:98.6 °F (37 °C), Max:101.1 °F (38.4 °C)      Vitals Ranges:   Temp:  [98.6 °F (37 °C)-101.1 °F (38.4 °C)] 99.1 °F (37.3 °C)  Heart Rate:  [] 90  Resp:  [18-22] 22  BP: (140-151)/(54-76) 140/68    Intake/Output Last 3 shifts:  No intake/output data recorded.     Physical Exam:    General Appearance:    Alert, cooperative, NAD, sitting at bedside   Back:     No CVA tenderness   Lungs:     Respirations unlabored, no wheezing   Abdomen:     Soft, NDNT, no masses, no guarding   :    Pelvic not performed   Neuro/Psych:   Orientation intact, mood/affect pleasant       Results review:   I reviewed the patient's new clinical results.    Data review:  Lab Results (last 24 hours)       Procedure Component Value Units Date/Time    Potassium [[id number]] Collected: 24 0753    Specimen: Blood Updated: 24 0758    POC Glucose Once [[id number]]  (Abnormal) Collected: 24    Specimen: Blood Updated: 24     Glucose 193 mg/dL     Magnesium [[id number]]  (Abnormal) Collected: 24    Specimen: Blood Updated: 24     Magnesium 1.4 mg/dL     Potassium [[id number]]  (Abnormal) Collected: 24    Specimen: Blood Updated: 24     Potassium 3.1 mmol/L     Renal Function Panel [[id number]]  (Abnormal) Collected: 24    Specimen: Blood Updated: 24     Glucose 177 mg/dL      BUN 4 mg/dL      Creatinine 0.55 mg/dL      Sodium 135 mmol/L      Potassium 3.1 mmol/L      Chloride 101 mmol/L      CO2 21.8 mmol/L      Calcium 8.0 mg/dL      Albumin 2.5 g/dL      Phosphorus 2.2 mg/dL      Anion Gap 12.2 mmol/L       BUN/Creatinine Ratio 7.3     eGFR 107.7 mL/min/1.73     Narrative:      GFR Normal >60  Chronic Kidney Disease <60  Kidney Failure <15      CBC & Differential [[id number]]  (Abnormal) Collected: 06/09/24 0332    Specimen: Blood Updated: 06/09/24 0346    Narrative:      The following orders were created for panel order CBC & Differential.  Procedure                               Abnormality         Status                     ---------                               -----------         ------                     CBC Auto Differential[[id number]]        Abnormal            Final result                 Please view results for these tests on the individual orders.    CBC Auto Differential [[id number]]  (Abnormal) Collected: 06/09/24 0332    Specimen: Blood Updated: 06/09/24 0346     WBC 16.03 10*3/mm3      RBC 3.61 10*6/mm3      Hemoglobin 10.1 g/dL      Hematocrit 31.0 %      MCV 85.9 fL      MCH 28.0 pg      MCHC 32.6 g/dL      RDW 11.8 %      RDW-SD 37.1 fl      MPV 10.0 fL      Platelets 332 10*3/mm3      Neutrophil % 79.3 %      Lymphocyte % 12.8 %      Monocyte % 5.9 %      Eosinophil % 1.1 %      Basophil % 0.2 %      Immature Grans % 0.7 %      Neutrophils, Absolute 12.70 10*3/mm3      Lymphocytes, Absolute 2.05 10*3/mm3      Monocytes, Absolute 0.94 10*3/mm3      Eosinophils, Absolute 0.18 10*3/mm3      Basophils, Absolute 0.04 10*3/mm3      Immature Grans, Absolute 0.12 10*3/mm3      nRBC 0.0 /100 WBC     POC Glucose Once [[id number]]  (Abnormal) Collected: 06/08/24 2005    Specimen: Blood Updated: 06/08/24 2006     Glucose 160 mg/dL     Urine Culture - Urine, Urine, Clean Catch [[id number]] Collected: 06/07/24 1723    Specimen: Urine, Clean Catch Updated: 06/08/24 1850     Urine Culture <10,000 CFU/mL Mixed Evonne Isolated    Narrative:      Specimen contains mixed organisms of questionable pathogenicity suggestive of contamination. If symptoms persist, suggest recollection.  Colonization of the urinary tract without  infection is common. Treatment is discouraged unless the patient is symptomatic, pregnant, or undergoing an invasive urologic procedure.    Potassium [[id number]]  (Abnormal) Collected: 06/08/24 1812    Specimen: Blood Updated: 06/08/24 1836     Potassium 2.7 mmol/L     Blood Culture - Blood, Arm, Right [[id number]]  (Normal) Collected: 06/07/24 1706    Specimen: Blood from Arm, Right Updated: 06/08/24 1832     Blood Culture No growth at 24 hours    Blood Culture - Blood, Hand, Left [[id number]]  (Normal) Collected: 06/07/24 1715    Specimen: Blood from Hand, Left Updated: 06/08/24 1832     Blood Culture No growth at 24 hours    Narrative:      Less than seven (7) mL's of blood was collected.  Insufficient quantity may yield false negative results.    POC Glucose Once [[id number]]  (Abnormal) Collected: 06/08/24 1600    Specimen: Blood Updated: 06/08/24 1601     Glucose 259 mg/dL     POC Glucose Once [[id number]]  (Abnormal) Collected: 06/08/24 1143    Specimen: Blood Updated: 06/08/24 1145     Glucose 176 mg/dL     Potassium [[id number]]  (Normal) Collected: 06/08/24 1014    Specimen: Blood Updated: 06/08/24 1044     Potassium 3.5 mmol/L              Imaging:  Imaging Results (Last 24 Hours)       ** No results found for the last 24 hours. **             Assessment:     Urinary tract infection  Perinephric abscess    Plan:   - No acute urologic surgical intervention planned  - Agree with general surgery- recommend IR evaluation for drainage of abscess  - Continue antibiotics per the recommendation of ID  - Urology will follow    BRANDY Rojo  06/09/24  08:06 EDT    Plan reviewed and discussed with Dr. Bird

## 2024-06-09 NOTE — PROGRESS NOTES
"RN messaged to report patient wanted to leave AMA. I revisited the patient and discussed with her again the recommendation for IR drainage and IV antibiotics to treat her retroperitoneal abscess. I explained the expected projection of treatment, alternatives, and the risks of leaving AMA with untreated infection including but not limited to worsening sepsis and death. During our conversation the patient demonstrated capacity to make decisions. She is adamant about leaving, stating she feels like her management is taking too long and that she has been \"strung along\" since she first arrived at her outside facility. She states that she is aware of the risks of leaving and despite these refuses to stay for treatment. Updated the nurse, IR, and Urology. Asked RN to inform primary team.    Letitia Barrera M.D.  General, Robotic, and Endoscopic Surgery  [facility]    [address], [address]  [place], [place], [address]  P: [phone]  F: [phone]     "

## 2024-06-09 NOTE — CONSULTS
General Surgery Consultation    Consulting Physician: Letitia Barrera MD  Referring: Galina Maravilla APRN    Reason for consultation:   perinephric vs psoas vs retroperitoneal abcess    CC:   Back pain    HPI:   The patient is a 54 y.o. female who presented to the hospital as a transfer from [place] for concern for possible retroperitoneal abscess versus hematoma.  The patient reports approximately 1 month history of worsening lower back pain with associated nausea, vomiting, low-grade fevers, chills, night sweats, occasional lightheadedness, and 30 pound weight loss in the past year.  She states she had some loose stools without any blood in them and notes that she has had normal urine output without any hematuria or dysuria, just states her urine is foul-smelling.  She denies associated chest pain, shortness of breath, or palpitations.      She has a history of open cholecystectomy and denies other abdominal surgeries.  She denies taking any blood thinners.  She smokes 1 to 2 packs of cigarettes per day since age 12.    Past Medical History:  Tobacco use  Obesity, BMI 32    Past Surgical History:  Open cholecystectomy for gallbladder rupture  Denies prior EGD/colonoscopy  Denies prior urinary tract instrumentation    Medications:  Medications Prior to Admission   Medication Sig Dispense Refill Last Dose    acetaminophen (TYLENOL) 325 MG tablet Take 2 tablets by mouth Every 4 (Four) Hours As Needed for Mild Pain.          Current Facility-Administered Medications:     acetaminophen (TYLENOL) tablet 650 mg, 650 mg, Oral, Q4H PRN, 650 mg at 06/08/24 2335 **OR** acetaminophen (TYLENOL) 160 MG/5ML oral solution 650 mg, 650 mg, Oral, Q4H PRN **OR** acetaminophen (TYLENOL) suppository 650 mg, 650 mg, Rectal, Q4H PRN, Michael Jane MD    sennosides-docusate (PERICOLACE) 8.6-50 MG per tablet 2 tablet, 2 tablet, Oral, BID PRN **AND** polyethylene glycol (MIRALAX) packet 17 g, 17 g, Oral, Daily PRN **AND**  bisacodyl (DULCOLAX) EC tablet 5 mg, 5 mg, Oral, Daily PRN **AND** bisacodyl (DULCOLAX) suppository 10 mg, 10 mg, Rectal, Daily PRN, Michael Jane MD    Calcium Replacement - Follow Nurse / BPA Driven Protocol, , Does not apply, PRN, Michael Jane MD    cefTRIAXone (ROCEPHIN) 2,000 mg in sodium chloride 0.9 % 100 mL MBP, 2,000 mg, Intravenous, Q24H, Efren George MD, Last Rate: 200 mL/hr at 06/08/24 1729, 2,000 mg at 06/08/24 1729    dextrose (D50W) (25 g/50 mL) IV injection 25 g, 25 g, Intravenous, Q15 Min PRN, Michael Jane MD    dextrose (GLUTOSE) oral gel 15 g, 15 g, Oral, Q15 Min PRN, Michael Jane MD    glucagon (GLUCAGEN) injection 1 mg, 1 mg, Intramuscular, Q15 Min PRN, Michael Jane MD    insulin glargine (LANTUS, SEMGLEE) injection 25 Units, 25 Units, Subcutaneous, Nightly, Michael Jane MD, 25 Units at 06/08/24 2011    insulin lispro (HUMALOG/ADMELOG) injection 2-9 Units, 2-9 Units, Subcutaneous, 4x Daily AC & at Bedtime, Michael Jane MD, 2 Units at 06/09/24 0806    insulin lispro (HUMALOG/ADMELOG) injection 5 Units, 5 Units, Subcutaneous, TID With Meals, Michael Jane MD, 5 Units at 06/09/24 0807    Magnesium Standard Dose Replacement - Follow Nurse / BPA Driven Protocol, , Does not apply, PRN, Michael Jane MD    magnesium sulfate 2g/50 mL (PREMIX) infusion, 2 g, Intravenous, Q2H, Efren George MD, 2 g at 06/09/24 0807    morphine injection 2 mg, 2 mg, Intravenous, Q4H PRN **AND** naloxone (NARCAN) injection 0.4 mg, 0.4 mg, Intravenous, Q5 Min PRN, Michael Jane MD    nitroglycerin (NITROSTAT) SL tablet 0.4 mg, 0.4 mg, Sublingual, Q5 Min PRN, Michael Jane MD    ondansetron (ZOFRAN) injection 4 mg, 4 mg, Intravenous, Q6H PRN, Michael Jane MD    Phosphorus Replacement - Follow Nurse / BPA Driven Protocol, , Does not apply, PRN, Michael Jane MD    Potassium Replacement - Follow Nurse / BPA Driven Protocol, , Does not apply, PRN, Michael Jane MD    sodium chloride 0.9 % flush 10 mL, 10  mL, Intravenous, Q12H, Michael Jane MD, 10 mL at 24    sodium chloride 0.9 % flush 10 mL, 10 mL, Intravenous, PRN, Michael Jane MD    sodium chloride 0.9 % infusion 40 mL, 40 mL, Intravenous, PRN, Michael Jane MD    sodium chloride 0.9 % with KCl 20 mEq/L infusion, 75 mL/hr, Intravenous, Continuous, Efren George MD, Last Rate: 75 mL/hr at 24, 75 mL/hr at 24    Allergies:   No Known Allergies    Social History:   Smokes 1 to 2 packs of cigarettes a day since age 12  Denies recreational drug or alcohol use  Lives at home with     Family History:   Mother  of colon cancer  Maternal aunt had breast cancer    Review of Systems:  Constitutional: Positive fevers, chills, 30 pound weight loss in the last year, positive night sweats  Eyes: denies vision changes, scleral icterus  Cardiovascular: denies chest pain or palpitations   Respiratory: denies cough or shortness of breath  Gastrointestinal:  denies abdominal pain, positive nausea, vomiting, diarrhea, denies constipation, melena, hematochezia  Genitourinary: denies dysuria or hematuria, denies dark urine  Musculoskeletal: denies weakness  Neurologic: denies headache, focal weakness, or numbness  Skin: denies rash or jaundice     Physical Exam:   Vitals:    24 0726   BP: 140/68   Pulse: 90   Resp: 22   Temp: 99.1 °F (37.3 °C)   SpO2: 97%     GENERAL: Awake, alert, interactive, cooperative, appears comfortable sitting up at the edge of the bed, answering questions appropriately  HEENT: no scleral icterus; moist mucous membranes  NECK: Supple  RESPIRATORY: normal work of breathing   CARDIOVASCULAR: Regular rate  GASTROINTESTINAL: Abdomen obese, soft, nondistended, nontender, well-healed Kocher incision, mild hyperpigmentation within the skin fold of her panniculus without evidence of infection  : No CVA tenderness  MUSCULOSKELETAL: no cyanosis or edema   NEUROLOGIC: alert and oriented, normal speech, no gross  focal deficits   SKIN: warm, no rash, no jaundice      Diagnostic workup:     Pertinent labs:   Results from last 7 days   Lab Units 06/09/24  0332 06/08/24  0537 06/07/24  1706   WBC 10*3/mm3 16.03* 16.58* 15.37*   HEMOGLOBIN g/dL 10.1* 10.7* 12.4   HEMATOCRIT % 31.0* 32.7* 38.8   PLATELETS 10*3/mm3 332 404 451*     Results from last 7 days   Lab Units 06/09/24  0753 06/09/24  0332 06/08/24  1812 06/08/24  1014 06/08/24  0537 06/07/24  1706   SODIUM mmol/L  --  135*  --   --  138 137   POTASSIUM mmol/L 3.4* 3.1*  3.1* 2.7*   < > 2.9* 2.7*   CHLORIDE mmol/L  --  101  --   --  102 96*   CO2 mmol/L  --  21.8*  --   --  22.2 22.4   BUN mg/dL  --  4*  --   --  4* 6   CREATININE mg/dL  --  0.55*  --   --  0.48* 0.65   CALCIUM mg/dL  --  8.0*  --   --  8.0* 8.8   BILIRUBIN mg/dL  --   --   --   --  0.3 0.4   ALK PHOS U/L  --   --   --   --  110 118*   ALT (SGPT) U/L  --   --   --   --  15 11   AST (SGOT) U/L  --   --   --   --  15 14   GLUCOSE mg/dL  --  177*  --   --  265* 282*    < > = values in this interval not displayed.   Urinalysis 3+ glucose, 1+ ketones, trace blood, negative nitrates, trace leukocytes, 1+ protein, negative bilirubin, 21-50 WBCs, 3-5 RBCs, 3-6 squamous epithelial cells    C. difficile 6/7/2024 negative  Blood cultures 6/7/2024 no growth at 24 hours  Urine culture 6/7/2024 less than 10,000 CFU per mL mixed chinyere isolated    Imaging:  CT abdomen and pelvis 6/6/2024 report and addendum reviewed    CT abdomen pelvis 6/9/2024 images and report reviewed, there is a rim-enhancing fluid collection centered in the right psoas muscle extending into the kidney along the capsule of the superior pole and mid kidney, consistent with abscess, with suspected acute pyelonephritis, bilateral nonobstructing kidney stones, fatty liver, and evidence of diarrhea    Assessment and plan:   The patient is a 54 y.o. female with with tobacco abuse and obesity and newly diagnosed diabetes presenting with right psoas  abscess    Recommend IR evaluation for drainage.  Continue IV antibiotics.  ID following.    Letitia Barrera M.D.  General, Robotic, and Endoscopic Surgery  [facility]    [address], [address]  [place], [place], [address]  P: [phone]  F: [phone]

## 2024-06-09 NOTE — CONSULTS
"Referring Provider: South Aguayo MD  Reason for Consultation:     Possible perinephric abscess         Subjective   History of present illness: Patient is a 54-year-old female who presented with right-sided flank pain and increased urination with nausea and vomiting.  ID consulted for \"possible perinephric abscess\".    Patient initially presented to outside facility where she received CT abdomen pelvis that showed right-sided perinephric abscess and transferred to [facility] for further evaluation.  Also noted to be hyperglycemic with new diagnosis of diabetes.  General surgery and urology have been consulted.  She is on ceftriaxone.    Patient reports today she continues to have right-sided pain.  Reports her nausea is somewhat better.  Tmax of 101.1 with continued leukocytosis of 16.    History reviewed. No pertinent past medical history.    History reviewed. No pertinent surgical history.    family history includes Alcohol abuse in her mother; Cancer in her mother.     reports that she has been smoking cigarettes. She started smoking about 42 years ago. She has a 42.4 pack-year smoking history. She has never used smokeless tobacco. She reports that she does not drink alcohol and does not use drugs.     No Known Allergies    Medication:  Antibiotics:  Anti-Infectives (From admission, onward)      Ordered     Dose/Rate Route Frequency Start Stop    06/08/24 0907  cefTRIAXone (ROCEPHIN) 2,000 mg in sodium chloride 0.9 % 100 mL MBP        Ordering Provider: Efren George MD    2,000 mg  200 mL/hr over 30 Minutes Intravenous Every 24 Hours 06/08/24 1745 06/14/24 1744              Objective     Physical Exam:   Vital Signs   Temp:  [98.6 °F (37 °C)-101.1 °F (38.4 °C)] 99.1 °F (37.3 °C)  Heart Rate:  [] 90  Resp:  [18-22] 22  BP: (140-151)/(54-76) 140/68    GENERAL: Awake and alert, in no acute distress.   HEENT: Oropharynx is clear. Hearing is grossly normal.   EYES: PERRL. No " "conjunctival injection. No lid lag.   LUNGS: Normal work of breathing  SKIN: Warm and dry without cutaneous eruptions in exposed areas  PSYCHIATRIC: Appropriate mood, affect, insight, and judgment.     Results Review:   I reviewed the patient's new clinical results.  I reviewed the patient's new imaging results and agree with the interpretation.  I reviewed the patient's other test results and agree with the interpretation    Lab Results   Component Value Date    WBC 16.03 (H) 06/09/2024    HGB 10.1 (L) 06/09/2024    HCT 31.0 (L) 06/09/2024    MCV 85.9 06/09/2024     06/09/2024       No results found for: \"VANCOPEAK\", \"VANCOTROUGH\", \"VANCORANDOM\"    Lab Results   Component Value Date    GLUCOSE 177 (H) 06/09/2024    BUN 4 (L) 06/09/2024    CREATININE 0.55 (L) 06/09/2024    BCR 7.3 06/09/2024    CO2 21.8 (L) 06/09/2024    CALCIUM 8.0 (L) 06/09/2024    ALBUMIN 2.5 (L) 06/09/2024    AST 15 06/08/2024    ALT 15 06/08/2024         Estimated Creatinine Clearance: 129.1 mL/min (A) (by C-G formula based on SCr of 0.55 mg/dL (L)).      Microbiology:  6/7 C. difficile negative  6/7 blood cultures no growth to date  6/7 urine culture less than 10,000 mixed chinyere    Radiology:  6/6 CT abdomen and pelvis report reviewed with complex collection around the right kidney extending retroperitoneally consistent with perinephric abscess and retroperitoneal extension.    Assessment     #Possible perinephric abscess  #Sepsis, fever and leukocytosis  #New onset diabetes  #Hypokalemia     Continue ceftriaxone 2 g daily.  Will follow-up surgical evaluation and blood cultures.    Thank you for this consult.  We will continue to follow along and tailor antibiotics as the patient's clinical course evolves.    "

## 2024-06-09 NOTE — PLAN OF CARE
Goal Outcome Evaluation:  Plan of Care Reviewed With: patient        Progress: no change  Outcome Evaluation: Pt A&Ox4. Potassium replaced per protocol. IV fluids continue. General surgery consult placed. Pt ambulating in room without difficulty.

## 2024-06-09 NOTE — PROGRESS NOTES
[facility]    [phone]   LOS: 2 days     Name: Nia SCALES  Age/Sex: 54 y.o. female  :  1970        PCP: Provider, No Known  No chief complaint on file.     Subjective   Complains of pain in her right hip and low back as well as right flank.  She is requesting to take a shower.  Denies other new issues or complaints this morning.  General: No Fever or Chills, Cardiac: No Chest Pain or Palpitations, Resp: No Cough or SOA, GI: No Nausea, Vomiting, or Diarrhea, and Other: No bleeding    cefTRIAXone, 2,000 mg, Intravenous, Q24H  insulin glargine, 25 Units, Subcutaneous, Nightly  insulin lispro, 2-9 Units, Subcutaneous, 4x Daily AC & at Bedtime  insulin lispro, 5 Units, Subcutaneous, TID With Meals  magnesium sulfate, 2 g, Intravenous, Q2H  potassium chloride ER, 40 mEq, Oral, Q4H  sodium chloride, 10 mL, Intravenous, Q12H      sodium chloride 0.9 % with KCl 20 mEq, 75 mL/hr, Last Rate: 75 mL/hr (24 2340)        Objective   Vital Signs  Temp:  [98.6 °F (37 °C)-101.1 °F (38.4 °C)] 99.1 °F (37.3 °C)  Heart Rate:  [] 90  Resp:  [18-22] 22  BP: (140-151)/(54-76) 140/68  Body mass index is 32.02 kg/m².    Intake/Output Summary (Last 24 hours) at 2024 0957  Last data filed at 2024 0726  Gross per 24 hour   Intake 240 ml   Output --   Net 240 ml       Physical Exam  Vitals and nursing note reviewed.   Constitutional:       General: She is not in acute distress.     Appearance: She is ill-appearing.   Cardiovascular:      Rate and Rhythm: Normal rate and regular rhythm.   Pulmonary:      Effort: No respiratory distress.      Breath sounds: Normal breath sounds.   Neurological:      General: No focal deficit present.      Mental Status: She is alert. Mental status is at baseline.           Results Review:       I reviewed the patient's new clinical results.  Results from last 7 days   Lab Units 24  0332 24  0537 24  1706   WBC 10*3/mm3 16.03* 16.58* 15.37*    HEMOGLOBIN g/dL 10.1* 10.7* 12.4   PLATELETS 10*3/mm3 332 404 451*     Results from last 7 days   Lab Units 06/09/24  0753 06/09/24  0332 06/08/24  1812 06/08/24  1014 06/08/24  0537 06/07/24  1706   SODIUM mmol/L  --  135*  --   --  138 137   POTASSIUM mmol/L 3.4* 3.1*  3.1* 2.7* 3.5 2.9* 2.7*   CHLORIDE mmol/L  --  101  --   --  102 96*   CO2 mmol/L  --  21.8*  --   --  22.2 22.4   BUN mg/dL  --  4*  --   --  4* 6   CREATININE mg/dL  --  0.55*  --   --  0.48* 0.65   CALCIUM mg/dL  --  8.0*  --   --  8.0* 8.8   MAGNESIUM mg/dL  --  1.4*  --   --  1.6 1.8   PHOSPHORUS mg/dL  --  2.2*  --   --   --   --    Estimated Creatinine Clearance: 129.1 mL/min (A) (by C-G formula based on SCr of 0.55 mg/dL (L)).      Assessment & Plan   Active Hospital Problems    Diagnosis  POA    **Perinephric abscess [N15.1]  Unknown    Diabetes mellitus, new onset [E11.9]  Unknown    Hypokalemia [E87.6]  Unknown    UTI (urinary tract infection) [N39.0]  Unknown    Dehydration [E86.0]  Unknown    Diarrhea [R19.7]  Unknown      Resolved Hospital Problems   No resolved problems to display.       PLAN  This is a 54-year-old lady with lack of significant past medical history who presents to the hospital with flank pain and back pain is found to have new onset diabetes as well as possible perinephric abscess  -Urology's been consulted we will follow-up their recommendations and follow-up additional imaging if required.  -MRIs of the lumbar and thoracic region have been ordered to evaluate for extension of this infection  -Infectious disease recommendations reviewed  -Replace potassium per protocol  -Continue IV Rocephin but increase dose to 2 g given the concern for abscess  -Follow-up urine and blood cultures  -Decrease IV fluids  -Okay to shower  -Mechanical DVT prophylaxis  -Full code      Disposition  Expected discharge date/ time has not been documented.       Efren George MD  [facility]  06/09/24  09:57  EDT

## 2024-06-10 NOTE — PAYOR COMM NOTE
"Nia SCALES (54 y.o. Female)     PLEASE SEE ATTACHED FOR INPT AUTH     PT ID [id number]     PLEASE CALL RAUL ARCHULETA RN/ DEPT @ [phone]  OR FAX  DEPARTMENT @  [phone]    THANK YOU   RAUL ARCHULETA RN  [facility]          Date of Birth   1970    Social Security Number       Address   [address] [place] [address] [address] BAZZI [address] [place], [place] [id number]    Home Phone   [phone]    MRN   [id number]       [employer]   None    Marital Status                               Admission Date   6/7/24    Admission Type   Urgent    Admitting Provider   South Aguayo MD    Attending Provider       Department, Room/Bed   [address], S607/1       Discharge Date   6/9/2024    Discharge Disposition   Left Against Medical Advice    Discharge Destination                                 Attending Provider: (none)   Allergies: No Known Allergies    Isolation: None   Infection: None   Code Status: Prior    Ht: 167.6 cm (66\")   Wt: 90 kg (198 lb 6.6 oz)    Admission Cmt: None   Principal Problem: Perinephric abscess [N15.1]                   Active Insurance as of 6/7/2024       Primary Coverage       Payor Plan Insurance Group Employer/Plan Group    [employer] BLUE CROSS AN[employer]M BLUE CROSS BLUE SHIELD PPO [id number]       Payor Plan Address Payor Plan Phone Number Payor Plan Fax Number Effective Dates    [address] [phone]  6/1/2023 - None Entered    [place] [address]         Subscriber Name Subscriber Birth Date Member ID       FLORENCIO SCALES [birth year]1960 [id number]                     Emergency Contacts        (Rel.) Home Phone Work Phone Mobile Phone    FLORENCIO SCALES (Spouse) -- -- [phone]              [place]: [facility] [id number]  Tax ID [id number]     History & Physical        Michael Jane MD at 06/07/24 9569          Internal medicine history and physical  INTERNAL MEDICINE   [facility]       Patient Identification:  Name: Nia Liangn " LOYDA  Age: 54 y.o.  Sex: female  :  1970  MRN: [id number]                   Primary Care Physician: Provider, No Known                               Date of admission:2024    Chief Complaint: Presented to the outside facility emergency room with 1 week history of nausea and vomiting and right-sided flank pain.    History of Present Illness:   Patient is a 54-year-old female who was otherwise healthy and does not take any medications started noticing about a month ago that she was getting increasingly thirsty and urinating a lot and progressively getting weak.  In this background a week prior to her visit to the emergency room at outside facility last night patient started having discomfort in the right side of her abdomen and back associated with increased urination and nausea and vomiting and diarrhea.  Her symptoms got worse couple days before going to the emergency room where evaluation revealed abnormal urinalysis, hyperglycemia-blood sugar of 528, low sodium level of 130 and bicarb of 26.  She was noted to have white blood cell count of 18,000 and was noted to have abnormal urinalysis consistent with UTI.  CT scan of the abdomen and pelvis showed right-sided nephric/perinephric abscess.  Patient was accepted to our facility last night for further management of hyperglycemia and hypoglycemia and perinephric abscess and UTI.  Patient arrived to our facility later this afternoon.  Patient was admitted and accepted by the team and was transferred her care to me because of the timing of her arrival and the start of my swing shift.  Patient herself feels about the same in terms of discomfort in her back nausea and feeling thirsty.  Patient denies any anyone cells having any of the symptoms at home.  Patient was brought to the emergency room at the outside facility by her  in the private vehicle.  Patient admits to smoke about a pack per day for quite some time but denies any drinking.      Past  Medical History:  History reviewed. No pertinent past medical history.  Past Surgical History:  History reviewed. No pertinent surgical history.   Home Meds:  Medications Prior to Admission   Medication Sig Dispense Refill Last Dose    acetaminophen (TYLENOL) 325 MG tablet Take 2 tablets by mouth Every 4 (Four) Hours As Needed for Mild Pain.        Current Meds:   No current facility-administered medications for this encounter.  Allergies:  No Known Allergies  Social History:   Social History     Tobacco Use    Smoking status: Every Day     Current packs/day: 1.00     Average packs/day: 1 pack/day for 42.4 years (42.4 ttl pk-yrs)     Types: Cigarettes     Start date: 1982    Smokeless tobacco: Never   Substance Use Topics    Alcohol use: Never      Family History:  Family History   Problem Relation Age of Onset    Cancer Mother     Alcohol abuse Mother           Review of Systems  See history of present illness and past medical history.   As described in the history of presenting illness.      Vitals:   /80 (BP Location: Left arm, Patient Position: Sitting)   Temp 98.1 °F (36.7 °C) (Oral)   Resp 18   I/O: No intake or output data in the 24 hours ending 06/07/24 1648  Exam:  Patient is examined using the personal protective equipment as per guidelines from infection control for this particular patient as enacted.  Hand washing was performed before and after patient interaction.  General Appearance:  Pleasant morbidly obese female who is comfortable and does not appear to be in any acute distress   Head:    Normocephalic, without obvious abnormality, atraumatic   Eyes:    PERRL, conjunctiva/corneas clear, EOM's intact, both eyes   Ears:    Normal external ear canals, both ears   Nose:   Nares normal, septum midline, mucosa normal, no drainage    or sinus tenderness   Throat:   Lips, tongue, gums normal; oral mucosa pink and moist   Neck:   Supple, symmetrical, trachea midline, no adenopathy;     thyroid:  no  enlargement/tenderness/nodules; no carotid    bruit or JVD   Back:     Symmetric, no curvature, ROM normal, right CVA tenderness   Lungs:     Clear to auscultation bilaterally, respirations unlabored   Chest Wall:    No tenderness or deformity    Heart:    Regular rate and rhythm, S1 and S2 normal, no murmur, rub   or gallop   Abdomen:   Obese right CVA tenderness   Extremities:   Extremities normal, atraumatic, no cyanosis or edema   Pulses:   Pulses palpable in all extremities; symmetric all extremities   Skin: No rash noted   Neurologic: Alert and oriented and grossly nonfocal       Data Review:      I reviewed the patient's new clinical results.  Lab data at the outside facility reviewed  Active Hospital Problems    Diagnosis  POA    Diabetes mellitus, new onset [E11.9]  Unknown    Hypokalemia [E87.6]  Unknown    UTI (urinary tract infection) [N39.0]  Unknown    Perinephric abscess [N15.1]  Unknown    Dehydration [E86.0]  Unknown    Diarrhea [R19.7]  Unknown       Medical decision making/care plan: See admitting orders  New onset diabetes with hyperosmolar state with associated metabolic derangements consisting of hyponatremia and hypokalemia with dehydration-plan is to admit the patient start her on scheduled insulin regimen consisting of long-acting and Premeal insulin with sliding scale coverage along with aggressive hydration and electrolyte replacement per protocol while monitoring her for any evolving ketoacidosis.  May benefit from diabetic education.  Check hemoglobin A1c.  Right flank tenderness with reported radiographic evidence of right nephric/perinephric abscess with leukocytosis and abnormal urinalysis-plan is to continue with IV Rocephin, blood cultures and urine culture and follow-up on the studies performed at the outside facility.  Morbid obesity-nutrition evaluation and education by diet and exercise to manage her diabetes.    Michael Jane MD   6/7/2024  16:48 EDT    Parts of this note may be  "an electronic transcription/translation of spoken language to printed text using the Dragon dictation system.      Electronically signed by Michael Jane MD at 24 0541       Emergency Department Notes    No notes of this type exist for this encounter.          Physician Progress Notes (last 72 hours)        Letitia Barrera MD at 24 1234          RN messaged to report patient wanted to leave AMA. I revisited the patient and discussed with her again the recommendation for IR drainage and IV antibiotics to treat her retroperitoneal abscess. I explained the expected projection of treatment, alternatives, and the risks of leaving AMA with untreated infection including but not limited to worsening sepsis and death. During our conversation the patient demonstrated capacity to make decisions. She is adamant about leaving, stating she feels like her management is taking too long and that she has been \"strung along\" since she first arrived at her outside facility. She states that she is aware of the risks of leaving and despite these refuses to stay for treatment. Updated the nurse, IR, and Urology. Asked RN to inform primary team.    Letitia Barrera M.D.  General, Robotic, and Endoscopic Surgery  [facility]    [address], [address]  [place], [place], [facility]  P: [phone]  F: [phone]       Electronically signed by Letitia Barrera MD at 24 1238       Efren George MD at 24 0957            [facility] to Hospital Care    [phone]   LOS: 2 days     Name: Nia SCALES  Age/Sex: 54 y.o. female  :  1970        PCP: Provider, No Known  No chief complaint on file.     Subjective   Complains of pain in her right hip and low back as well as right flank.  She is requesting to take a shower.  Denies other new issues or complaints this morning.  General: No Fever or Chills, Cardiac: No Chest Pain or Palpitations, Resp: No Cough or SOA, GI: No Nausea, " Vomiting, or Diarrhea, and Other: No bleeding    cefTRIAXone, 2,000 mg, Intravenous, Q24H  insulin glargine, 25 Units, Subcutaneous, Nightly  insulin lispro, 2-9 Units, Subcutaneous, 4x Daily AC & at Bedtime  insulin lispro, 5 Units, Subcutaneous, TID With Meals  magnesium sulfate, 2 g, Intravenous, Q2H  potassium chloride ER, 40 mEq, Oral, Q4H  sodium chloride, 10 mL, Intravenous, Q12H      sodium chloride 0.9 % with KCl 20 mEq, 75 mL/hr, Last Rate: 75 mL/hr (06/08/24 2340)        Objective   Vital Signs  Temp:  [98.6 °F (37 °C)-101.1 °F (38.4 °C)] 99.1 °F (37.3 °C)  Heart Rate:  [] 90  Resp:  [18-22] 22  BP: (140-151)/(54-76) 140/68  Body mass index is 32.02 kg/m².    Intake/Output Summary (Last 24 hours) at 6/9/2024 0957  Last data filed at 6/9/2024 0726  Gross per 24 hour   Intake 240 ml   Output --   Net 240 ml       Physical Exam  Vitals and nursing note reviewed.   Constitutional:       General: She is not in acute distress.     Appearance: She is ill-appearing.   Cardiovascular:      Rate and Rhythm: Normal rate and regular rhythm.   Pulmonary:      Effort: No respiratory distress.      Breath sounds: Normal breath sounds.   Neurological:      General: No focal deficit present.      Mental Status: She is alert. Mental status is at baseline.           Results Review:       I reviewed the patient's new clinical results.  Results from last 7 days   Lab Units 06/09/24  0332 06/08/24  0537 06/07/24  1706   WBC 10*3/mm3 16.03* 16.58* 15.37*   HEMOGLOBIN g/dL 10.1* 10.7* 12.4   PLATELETS 10*3/mm3 332 404 451*     Results from last 7 days   Lab Units 06/09/24  0753 06/09/24  0332 06/08/24  1812 06/08/24  1014 06/08/24  0537 06/07/24  1706   SODIUM mmol/L  --  135*  --   --  138 137   POTASSIUM mmol/L 3.4* 3.1*  3.1* 2.7* 3.5 2.9* 2.7*   CHLORIDE mmol/L  --  101  --   --  102 96*   CO2 mmol/L  --  21.8*  --   --  22.2 22.4   BUN mg/dL  --  4*  --   --  4* 6   CREATININE mg/dL  --  0.55*  --   --  0.48* 0.65    CALCIUM mg/dL  --  8.0*  --   --  8.0* 8.8   MAGNESIUM mg/dL  --  1.4*  --   --  1.6 1.8   PHOSPHORUS mg/dL  --  2.2*  --   --   --   --    Estimated Creatinine Clearance: 129.1 mL/min (A) (by C-G formula based on SCr of 0.55 mg/dL (L)).      Assessment & Plan   Active Hospital Problems    Diagnosis  POA    **Perinephric abscess [N15.1]  Unknown    Diabetes mellitus, new onset [E11.9]  Unknown    Hypokalemia [E87.6]  Unknown    UTI (urinary tract infection) [N39.0]  Unknown    Dehydration [E86.0]  Unknown    Diarrhea [R19.7]  Unknown      Resolved Hospital Problems   No resolved problems to display.       PLAN  This is a 54-year-old lady with lack of significant past medical history who presents to the hospital with flank pain and back pain is found to have new onset diabetes as well as possible perinephric abscess  -Urology's been consulted we will follow-up their recommendations and follow-up additional imaging if required.  -MRIs of the lumbar and thoracic region have been ordered to evaluate for extension of this infection  -Infectious disease recommendations reviewed  -Replace potassium per protocol  -Continue IV Rocephin but increase dose to 2 g given the concern for abscess  -Follow-up urine and blood cultures  -Decrease IV fluids  -Okay to shower  -Mechanical DVT prophylaxis  -Full code      Disposition  Expected discharge date/ time has not been documented.       Efren George MD  [facility]  24  09:57 EDT            Electronically signed by Efren George MD at 24 0958       Efren George MD at 24 1246            Dedicated to Hospital Care    [phone]   LOS: 1 day     Name: Nia SCALES  Age/Sex: 54 y.o. female  :  1970        PCP: Provider, No Known  No chief complaint on file.     Subjective   Complains of pain in her right hip and low back as well as right flank.  She is requesting to take a shower.  Denies other new issues or  complaints this morning.  General: No Fever or Chills, Cardiac: No Chest Pain or Palpitations, Resp: No Cough or SOA, GI: No Nausea, Vomiting, or Diarrhea, and Other: No bleeding    cefTRIAXone, 2,000 mg, Intravenous, Q24H  insulin glargine, 25 Units, Subcutaneous, Nightly  insulin lispro, 2-9 Units, Subcutaneous, 4x Daily AC & at Bedtime  insulin lispro, 5 Units, Subcutaneous, TID With Meals  sodium chloride, 10 mL, Intravenous, Q12H      sodium chloride 0.9 % with KCl 20 mEq, 75 mL/hr, Last Rate: 75 mL/hr (06/08/24 0948)        Objective   Vital Signs  Temp:  [97.7 °F (36.5 °C)-100.2 °F (37.9 °C)] 98.2 °F (36.8 °C)  Heart Rate:  [] 92  Resp:  [16-20] 20  BP: (147-156)/(61-80) 147/73  Body mass index is 32.02 kg/m².    Intake/Output Summary (Last 24 hours) at 6/8/2024 1246  Last data filed at 6/7/2024 1724  Gross per 24 hour   Intake --   Output 100 ml   Net -100 ml       Physical Exam  Vitals and nursing note reviewed.   Constitutional:       General: She is not in acute distress.     Appearance: She is ill-appearing.   Cardiovascular:      Rate and Rhythm: Normal rate and regular rhythm.   Pulmonary:      Effort: No respiratory distress.      Breath sounds: Normal breath sounds.   Neurological:      General: No focal deficit present.      Mental Status: She is alert. Mental status is at baseline.           Results Review:       I reviewed the patient's new clinical results.  Results from last 7 days   Lab Units 06/08/24  0537 06/07/24  1706   WBC 10*3/mm3 16.58* 15.37*   HEMOGLOBIN g/dL 10.7* 12.4   PLATELETS 10*3/mm3 404 451*     Results from last 7 days   Lab Units 06/08/24  1014 06/08/24  0537 06/07/24  1706   SODIUM mmol/L  --  138 137   POTASSIUM mmol/L 3.5 2.9* 2.7*   CHLORIDE mmol/L  --  102 96*   CO2 mmol/L  --  22.2 22.4   BUN mg/dL  --  4* 6   CREATININE mg/dL  --  0.48* 0.65   CALCIUM mg/dL  --  8.0* 8.8   MAGNESIUM mg/dL  --  1.6 1.8   Estimated Creatinine Clearance: 147.9 mL/min (A) (by C-G  formula based on SCr of 0.48 mg/dL (L)).      Assessment & Plan   Active Hospital Problems    Diagnosis  POA    **Perinephric abscess [N15.1]  Unknown    Diabetes mellitus, new onset [E11.9]  Unknown    Hypokalemia [E87.6]  Unknown    UTI (urinary tract infection) [N39.0]  Unknown    Dehydration [E86.0]  Unknown    Diarrhea [R19.7]  Unknown      Resolved Hospital Problems   No resolved problems to display.       PLAN  This is a 54-year-old lady with lack of significant past medical history who presents to the hospital with flank pain and back pain is found to have new onset diabetes as well as possible perinephric abscess  -Urology's been consulted we will follow-up their recommendations and follow-up additional imaging if required.  -Replace potassium per protocol  -Continue IV Rocephin but increase dose to 2 g given the concern for abscess  -Follow-up urine and blood cultures  -Decrease IV fluids  -Okay to shower  -Mechanical DVT prophylaxis  -Full code      Disposition  Expected discharge date/ time has not been documented.       Efren George MD  [facility]  06/08/24  12:46 EDT            Electronically signed by Efren George MD at 06/08/24 1251          Consult Notes (last 72 hours)        Letitia Barrera MD at 06/09/24 0941        Consult Orders    1. Inpatient General Surgery Consult [[id number]] ordered by Galina Maravilla APRN at 06/08/24 2796                 General Surgery Consultation    Consulting Physician: Letitia Barrera MD  Referring: Galina Maravilla APRN    Reason for consultation:   perinephric vs psoas vs retroperitoneal abcess    CC:   Back pain    HPI:   The patient is a 54 y.o. female who presented to the hospital as a transfer from [place] for concern for possible retroperitoneal abscess versus hematoma.  The patient reports approximately 1 month history of worsening lower back pain with associated nausea, vomiting, low-grade fevers, chills,  night sweats, occasional lightheadedness, and 30 pound weight loss in the past year.  She states she had some loose stools without any blood in them and notes that she has had normal urine output without any hematuria or dysuria, just states her urine is foul-smelling.  She denies associated chest pain, shortness of breath, or palpitations.      She has a history of open cholecystectomy and denies other abdominal surgeries.  She denies taking any blood thinners.  She smokes 1 to 2 packs of cigarettes per day since age 12.    Past Medical History:  Tobacco use  Obesity, BMI 32    Past Surgical History:  Open cholecystectomy for gallbladder rupture  Denies prior EGD/colonoscopy  Denies prior urinary tract instrumentation    Medications:  Medications Prior to Admission   Medication Sig Dispense Refill Last Dose    acetaminophen (TYLENOL) 325 MG tablet Take 2 tablets by mouth Every 4 (Four) Hours As Needed for Mild Pain.          Current Facility-Administered Medications:     acetaminophen (TYLENOL) tablet 650 mg, 650 mg, Oral, Q4H PRN, 650 mg at 06/08/24 2335 **OR** acetaminophen (TYLENOL) 160 MG/5ML oral solution 650 mg, 650 mg, Oral, Q4H PRN **OR** acetaminophen (TYLENOL) suppository 650 mg, 650 mg, Rectal, Q4H PRN, Michael Jane MD    sennosides-docusate (PERICOLACE) 8.6-50 MG per tablet 2 tablet, 2 tablet, Oral, BID PRN **AND** polyethylene glycol (MIRALAX) packet 17 g, 17 g, Oral, Daily PRN **AND** bisacodyl (DULCOLAX) EC tablet 5 mg, 5 mg, Oral, Daily PRN **AND** bisacodyl (DULCOLAX) suppository 10 mg, 10 mg, Rectal, Daily PRN, Michael Jane MD    Calcium Replacement - Follow Nurse / BPA Driven Protocol, , Does not apply, PRN, Michael Jane MD    cefTRIAXone (ROCEPHIN) 2,000 mg in sodium chloride 0.9 % 100 mL MBP, 2,000 mg, Intravenous, Q24H, Efren George MD, Last Rate: 200 mL/hr at 06/08/24 1729, 2,000 mg at 06/08/24 1729    dextrose (D50W) (25 g/50 mL) IV injection 25 g, 25 g, Intravenous, Q15 Min PRN,  Michael Jane MD    dextrose (GLUTOSE) oral gel 15 g, 15 g, Oral, Q15 Min PRN, Michael Jane MD    glucagon (GLUCAGEN) injection 1 mg, 1 mg, Intramuscular, Q15 Min PRN, Michael Jane MD    insulin glargine (LANTUS, SEMGLEE) injection 25 Units, 25 Units, Subcutaneous, Nightly, Michael Jane MD, 25 Units at 06/08/24 2011    insulin lispro (HUMALOG/ADMELOG) injection 2-9 Units, 2-9 Units, Subcutaneous, 4x Daily AC & at Bedtime, Michael Jane MD, 2 Units at 06/09/24 0806    insulin lispro (HUMALOG/ADMELOG) injection 5 Units, 5 Units, Subcutaneous, TID With Meals, Michael Jane MD, 5 Units at 06/09/24 0807    Magnesium Standard Dose Replacement - Follow Nurse / BPA Driven Protocol, , Does not apply, PRN, Michael Jane MD    magnesium sulfate 2g/50 mL (PREMIX) infusion, 2 g, Intravenous, Q2H, Efren George MD, 2 g at 06/09/24 0807    morphine injection 2 mg, 2 mg, Intravenous, Q4H PRN **AND** naloxone (NARCAN) injection 0.4 mg, 0.4 mg, Intravenous, Q5 Min PRN, Michael Jane MD    nitroglycerin (NITROSTAT) SL tablet 0.4 mg, 0.4 mg, Sublingual, Q5 Min PRN, Michael Jane MD    ondansetron (ZOFRAN) injection 4 mg, 4 mg, Intravenous, Q6H PRN, Michael Jane MD    Phosphorus Replacement - Follow Nurse / BPA Driven Protocol, , Does not apply, PRN, Michael Jane MD    Potassium Replacement - Follow Nurse / BPA Driven Protocol, , Does not apply, PRN, Michael Jane MD    sodium chloride 0.9 % flush 10 mL, 10 mL, Intravenous, Q12H, Michael Jane MD, 10 mL at 06/08/24 2002    sodium chloride 0.9 % flush 10 mL, 10 mL, Intravenous, PRN, Michael Jane MD    sodium chloride 0.9 % infusion 40 mL, 40 mL, Intravenous, PRN, Michael Jane MD    sodium chloride 0.9 % with KCl 20 mEq/L infusion, 75 mL/hr, Intravenous, Continuous, Efren George MD, Last Rate: 75 mL/hr at 06/08/24 2340, 75 mL/hr at 06/08/24 2340    Allergies:   No Known Allergies    Social History:   Smokes 1 to 2 packs of cigarettes a day since age 12  Denies  recreational drug or alcohol use  Lives at home with     Family History:   Mother  of colon cancer  Maternal aunt had breast cancer    Review of Systems:  Constitutional: Positive fevers, chills, 30 pound weight loss in the last year, positive night sweats  Eyes: denies vision changes, scleral icterus  Cardiovascular: denies chest pain or palpitations   Respiratory: denies cough or shortness of breath  Gastrointestinal:  denies abdominal pain, positive nausea, vomiting, diarrhea, denies constipation, melena, hematochezia  Genitourinary: denies dysuria or hematuria, denies dark urine  Musculoskeletal: denies weakness  Neurologic: denies headache, focal weakness, or numbness  Skin: denies rash or jaundice     Physical Exam:   Vitals:    24 0726   BP: 140/68   Pulse: 90   Resp: 22   Temp: 99.1 °F (37.3 °C)   SpO2: 97%     GENERAL: Awake, alert, interactive, cooperative, appears comfortable sitting up at the edge of the bed, answering questions appropriately  HEENT: no scleral icterus; moist mucous membranes  NECK: Supple  RESPIRATORY: normal work of breathing   CARDIOVASCULAR: Regular rate  GASTROINTESTINAL: Abdomen obese, soft, nondistended, nontender, well-healed Kocher incision, mild hyperpigmentation within the skin fold of her panniculus without evidence of infection  : No CVA tenderness  MUSCULOSKELETAL: no cyanosis or edema   NEUROLOGIC: alert and oriented, normal speech, no gross focal deficits   SKIN: warm, no rash, no jaundice      Diagnostic workup:     Pertinent labs:   Results from last 7 days   Lab Units 24  0332 24  0537 24  1706   WBC 10*3/mm3 16.03* 16.58* 15.37*   HEMOGLOBIN g/dL 10.1* 10.7* 12.4   HEMATOCRIT % 31.0* 32.7* 38.8   PLATELETS 10*3/mm3 332 404 451*     Results from last 7 days   Lab Units 24  0753 24  0332 24  1812 24  1014 24  0537 24  1706   SODIUM mmol/L  --  135*  --   --  138 137   POTASSIUM mmol/L 3.4* 3.1*   3.1* 2.7*   < > 2.9* 2.7*   CHLORIDE mmol/L  --  101  --   --  102 96*   CO2 mmol/L  --  21.8*  --   --  22.2 22.4   BUN mg/dL  --  4*  --   --  4* 6   CREATININE mg/dL  --  0.55*  --   --  0.48* 0.65   CALCIUM mg/dL  --  8.0*  --   --  8.0* 8.8   BILIRUBIN mg/dL  --   --   --   --  0.3 0.4   ALK PHOS U/L  --   --   --   --  110 118*   ALT (SGPT) U/L  --   --   --   --  15 11   AST (SGOT) U/L  --   --   --   --  15 14   GLUCOSE mg/dL  --  177*  --   --  265* 282*    < > = values in this interval not displayed.   Urinalysis 3+ glucose, 1+ ketones, trace blood, negative nitrates, trace leukocytes, 1+ protein, negative bilirubin, 21-50 WBCs, 3-5 RBCs, 3-6 squamous epithelial cells    C. difficile 6/7/2024 negative  Blood cultures 6/7/2024 no growth at 24 hours  Urine culture 6/7/2024 less than 10,000 CFU per mL mixed chinyere isolated    Imaging:  CT abdomen and pelvis 6/6/2024 report and addendum reviewed    CT abdomen pelvis 6/9/2024 images and report reviewed, there is a rim-enhancing fluid collection centered in the right psoas muscle extending into the kidney along the capsule of the superior pole and mid kidney, consistent with abscess, with suspected acute pyelonephritis, bilateral nonobstructing kidney stones, fatty liver, and evidence of diarrhea    Assessment and plan:   The patient is a 54 y.o. female with with tobacco abuse and obesity and newly diagnosed diabetes presenting with right psoas abscess    Recommend IR evaluation for drainage.  Continue IV antibiotics.  ID following.    Letitia Barrera M.D.  General, Robotic, and Endoscopic Surgery  [facility]    [address], [address]  [place], [place], [address]  P: [phone]  F: [phone]       Electronically signed by Letitia Barrera MD at 06/09/24 1005       Sunday Gustafson DO at 06/09/24 0835        Consult Orders    1. Inpatient Infectious Diseases Consult [[id number]] ordered by Efren George MD at 06/08/24 1257       "           Referring Provider: South Aguayo MD  Reason for Consultation:     Possible perinephric abscess         Subjective   History of present illness: Patient is a 54-year-old female who presented with right-sided flank pain and increased urination with nausea and vomiting.  ID consulted for \"possible perinephric abscess\".    Patient initially presented to outside facility where she received CT abdomen pelvis that showed right-sided perinephric abscess and transferred to [facility] for further evaluation.  Also noted to be hyperglycemic with new diagnosis of diabetes.  General surgery and urology have been consulted.  She is on ceftriaxone.    Patient reports today she continues to have right-sided pain.  Reports her nausea is somewhat better.  Tmax of 101.1 with continued leukocytosis of 16.    History reviewed. No pertinent past medical history.    History reviewed. No pertinent surgical history.    family history includes Alcohol abuse in her mother; Cancer in her mother.     reports that she has been smoking cigarettes. She started smoking about 42 years ago. She has a 42.4 pack-year smoking history. She has never used smokeless tobacco. She reports that she does not drink alcohol and does not use drugs.     No Known Allergies    Medication:  Antibiotics:  Anti-Infectives (From admission, onward)      Ordered     Dose/Rate Route Frequency Start Stop    06/08/24 0907  cefTRIAXone (ROCEPHIN) 2,000 mg in sodium chloride 0.9 % 100 mL MBP        Ordering Provider: Efren George MD    2,000 mg  200 mL/hr over 30 Minutes Intravenous Every 24 Hours 06/08/24 1745 06/14/24 1744              Objective     Physical Exam:   Vital Signs   Temp:  [98.6 °F (37 °C)-101.1 °F (38.4 °C)] 99.1 °F (37.3 °C)  Heart Rate:  [] 90  Resp:  [18-22] 22  BP: (140-151)/(54-76) 140/68    GENERAL: Awake and alert, in no acute distress.   HEENT: Oropharynx is clear. Hearing is grossly normal.   EYES: PERRL. No " "conjunctival injection. No lid lag.   LUNGS: Normal work of breathing  SKIN: Warm and dry without cutaneous eruptions in exposed areas  PSYCHIATRIC: Appropriate mood, affect, insight, and judgment.     Results Review:   I reviewed the patient's new clinical results.  I reviewed the patient's new imaging results and agree with the interpretation.  I reviewed the patient's other test results and agree with the interpretation    Lab Results   Component Value Date    WBC 16.03 (H) 06/09/2024    HGB 10.1 (L) 06/09/2024    HCT 31.0 (L) 06/09/2024    MCV 85.9 06/09/2024     06/09/2024       No results found for: \"VANCOPEAK\", \"VANCOTROUGH\", \"VANCORANDOM\"    Lab Results   Component Value Date    GLUCOSE 177 (H) 06/09/2024    BUN 4 (L) 06/09/2024    CREATININE 0.55 (L) 06/09/2024    BCR 7.3 06/09/2024    CO2 21.8 (L) 06/09/2024    CALCIUM 8.0 (L) 06/09/2024    ALBUMIN 2.5 (L) 06/09/2024    AST 15 06/08/2024    ALT 15 06/08/2024         Estimated Creatinine Clearance: 129.1 mL/min (A) (by C-G formula based on SCr of 0.55 mg/dL (L)).      Microbiology:  6/7 C. difficile negative  6/7 blood cultures no growth to date  6/7 urine culture less than 10,000 mixed chinyere    Radiology:  6/6 CT abdomen and pelvis report reviewed with complex collection around the right kidney extending retroperitoneally consistent with perinephric abscess and retroperitoneal extension.    Assessment     #Possible perinephric abscess  #Sepsis, fever and leukocytosis  #New onset diabetes  #Hypokalemia     Continue ceftriaxone 2 g daily.  Will follow-up surgical evaluation and blood cultures.    Thank you for this consult.  We will continue to follow along and tailor antibiotics as the patient's clinical course evolves.      Electronically signed by Sunday Gustafson DO at 06/09/24 08       Lina Tyler APRN at 06/09/24 0806        Consult Orders    1. Inpatient Urology Consult [[id number]] ordered by Efren George MD at 06/08/24 1251 "              Attestation signed by Madhav Bird MD at 24 7173    I have reviewed this documentation and agree.                         FIRST UROLOGY CONSULT      Patient Identification:  NAME:  Nia SCALES  Age:  54 y.o.   Sex:  female   :  1970   MRN:  [id number]     Chief complaint: Right flank pain, nausea and vomiting    History of present illness:      Pt is a 54 y.o. female that presented to [facility] as a direct admit on 24 for further management of her diabetes, perinephric abscess and UTI. Patient had been seen at an outside facility x 1 week ago with complaints of right sided flank pain, urinary frequency, nausea and vomiting. CT scan at outside facility showed a right nephric/perinephric abscess. Urology consulted for evaluation and management of a possible perinephric abscess. Pt denies dysuria, gross hematuria, current fever, nausea or vomiting. Reports right flank pain and urinary frequency. General surgery and ID following. General surgery recommending IR evaluation for drainage of abscess.    In hospital:  -Afebrile currently, temp 100.2-101.1 on admission, good UOP  -WBC - 16.03 (16.58)  -Creat - 0.55 (0.48)  -UA - Trace LE, negative nitrites, 3-5 RBC, 21-50 WBC, no bacteria 3-6 SE cells  -UCx - Mixed chinyere  -Blood culture - No growth    -CT - Report from outside facility - Right kidney displaced anteriorly by the retroperitoneal process. Posterior margin of the right kidney upper to interpolar indistinct with the adjacent complex collection, at least a very small component appears to be perirenal, contiguous with the psoas compartment component. Subtle heterogeneous decreased cortical enhancement of the adjacent right kidney in this region but otherwise the right kidney appears unremarkable. Left kidney unremarkable. No hydronephrosis. Small calculi in both kidneys. Complex predominantly low attenuation collection or mass in the right psoas muscle from the upper pole kidney  level to the level of the iliac crest, 7.8 x 5.6 cm axial by 11.7 cm cranial caudal with partial peripheral enhancement and septations. Mild adjacent stranding. No obvious extension into the spinal canal.      [facility] CT - Bilateral non-obstructing nephrolithiasis, large fluid collection centered in the right psoas extending into and displacing right kidney    Asked to see    Past medical history:  History reviewed. No pertinent past medical history.    Past surgical history:  History reviewed. No pertinent surgical history.    Allergies:  Patient has no known allergies.    Home medications:  Medications Prior to Admission   Medication Sig Dispense Refill Last Dose    acetaminophen (TYLENOL) 325 MG tablet Take 2 tablets by mouth Every 4 (Four) Hours As Needed for Mild Pain.           Hospital medications:  cefTRIAXone, 2,000 mg, Intravenous, Q24H  insulin glargine, 25 Units, Subcutaneous, Nightly  insulin lispro, 2-9 Units, Subcutaneous, 4x Daily AC & at Bedtime  insulin lispro, 5 Units, Subcutaneous, TID With Meals  magnesium sulfate, 2 g, Intravenous, Q2H  potassium & sodium phosphates, 2 packet, Oral, Once  sodium chloride, 10 mL, Intravenous, Q12H      sodium chloride 0.9 % with KCl 20 mEq, 75 mL/hr, Last Rate: 75 mL/hr (06/08/24 6610)        acetaminophen **OR** acetaminophen **OR** acetaminophen    senna-docusate sodium **AND** polyethylene glycol **AND** bisacodyl **AND** bisacodyl    Calcium Replacement - Follow Nurse / BPA Driven Protocol    dextrose    dextrose    glucagon (human recombinant)    Magnesium Standard Dose Replacement - Follow Nurse / BPA Driven Protocol    Morphine **AND** naloxone    nitroglycerin    ondansetron    Phosphorus Replacement - Follow Nurse / BPA Driven Protocol    Potassium Replacement - Follow Nurse / BPA Driven Protocol    sodium chloride    sodium chloride    Family history:  Family History   Problem Relation Age of Onset    Cancer Mother     Alcohol abuse Mother        Social  history:  Social History     Tobacco Use    Smoking status: Every Day     Current packs/day: 1.00     Average packs/day: 1 pack/day for 42.4 years (42.4 ttl pk-yrs)     Types: Cigarettes     Start date:     Smokeless tobacco: Never   Vaping Use    Vaping status: Never Used   Substance Use Topics    Alcohol use: Never    Drug use: Never       Review of systems:      12 point negative except as in HPI    Objective:  TMax 24 hours:   Temp (24hrs), Av.5 °F (37.5 °C), Min:98.6 °F (37 °C), Max:101.1 °F (38.4 °C)      Vitals Ranges:   Temp:  [98.6 °F (37 °C)-101.1 °F (38.4 °C)] 99.1 °F (37.3 °C)  Heart Rate:  [] 90  Resp:  [18-22] 22  BP: (140-151)/(54-76) 140/68    Intake/Output Last 3 shifts:  No intake/output data recorded.     Physical Exam:    General Appearance:    Alert, cooperative, NAD, sitting at bedside   Back:     No CVA tenderness   Lungs:     Respirations unlabored, no wheezing   Abdomen:     Soft, NDNT, no masses, no guarding   :    Pelvic not performed   Neuro/Psych:   Orientation intact, mood/affect pleasant       Results review:   I reviewed the patient's new clinical results.    Data review:  Lab Results (last 24 hours)       Procedure Component Value Units Date/Time    Potassium [[id number]] Collected: 24 0753    Specimen: Blood Updated: 24 075    POC Glucose Once [[id number]]  (Abnormal) Collected: 24    Specimen: Blood Updated: 24     Glucose 193 mg/dL     Magnesium [[id number]]  (Abnormal) Collected: 24    Specimen: Blood Updated: 24     Magnesium 1.4 mg/dL     Potassium [[id number]]  (Abnormal) Collected: 24    Specimen: Blood Updated: 24     Potassium 3.1 mmol/L     Renal Function Panel [[id number]]  (Abnormal) Collected: 24    Specimen: Blood Updated: 24     Glucose 177 mg/dL      BUN 4 mg/dL      Creatinine 0.55 mg/dL      Sodium 135 mmol/L      Potassium 3.1 mmol/L      Chloride 101  mmol/L      CO2 21.8 mmol/L      Calcium 8.0 mg/dL      Albumin 2.5 g/dL      Phosphorus 2.2 mg/dL      Anion Gap 12.2 mmol/L      BUN/Creatinine Ratio 7.3     eGFR 107.7 mL/min/1.73     Narrative:      GFR Normal >60  Chronic Kidney Disease <60  Kidney Failure <15      CBC & Differential [[id number]]  (Abnormal) Collected: 06/09/24 0332    Specimen: Blood Updated: 06/09/24 0346    Narrative:      The following orders were created for panel order CBC & Differential.  Procedure                               Abnormality         Status                     ---------                               -----------         ------                     CBC Auto Differential[[id number]]        Abnormal            Final result                 Please view results for these tests on the individual orders.    CBC Auto Differential [[id number]]  (Abnormal) Collected: 06/09/24 0332    Specimen: Blood Updated: 06/09/24 0346     WBC 16.03 10*3/mm3      RBC 3.61 10*6/mm3      Hemoglobin 10.1 g/dL      Hematocrit 31.0 %      MCV 85.9 fL      MCH 28.0 pg      MCHC 32.6 g/dL      RDW 11.8 %      RDW-SD 37.1 fl      MPV 10.0 fL      Platelets 332 10*3/mm3      Neutrophil % 79.3 %      Lymphocyte % 12.8 %      Monocyte % 5.9 %      Eosinophil % 1.1 %      Basophil % 0.2 %      Immature Grans % 0.7 %      Neutrophils, Absolute 12.70 10*3/mm3      Lymphocytes, Absolute 2.05 10*3/mm3      Monocytes, Absolute 0.94 10*3/mm3      Eosinophils, Absolute 0.18 10*3/mm3      Basophils, Absolute 0.04 10*3/mm3      Immature Grans, Absolute 0.12 10*3/mm3      nRBC 0.0 /100 WBC     POC Glucose Once [[id number]]  (Abnormal) Collected: 06/08/24 2005    Specimen: Blood Updated: 06/08/24 2006     Glucose 160 mg/dL     Urine Culture - Urine, Urine, Clean Catch [[id number]] Collected: 06/07/24 1723    Specimen: Urine, Clean Catch Updated: 06/08/24 1850     Urine Culture <10,000 CFU/mL Mixed Evonne Isolated    Narrative:      Specimen contains mixed organisms of  questionable pathogenicity suggestive of contamination. If symptoms persist, suggest recollection.  Colonization of the urinary tract without infection is common. Treatment is discouraged unless the patient is symptomatic, pregnant, or undergoing an invasive urologic procedure.    Potassium [[id number]]  (Abnormal) Collected: 06/08/24 1812    Specimen: Blood Updated: 06/08/24 1836     Potassium 2.7 mmol/L     Blood Culture - Blood, Arm, Right [[id number]]  (Normal) Collected: 06/07/24 1706    Specimen: Blood from Arm, Right Updated: 06/08/24 1832     Blood Culture No growth at 24 hours    Blood Culture - Blood, Hand, Left [[id number]]  (Normal) Collected: 06/07/24 1715    Specimen: Blood from Hand, Left Updated: 06/08/24 1832     Blood Culture No growth at 24 hours    Narrative:      Less than seven (7) mL's of blood was collected.  Insufficient quantity may yield false negative results.    POC Glucose Once [[id number]]  (Abnormal) Collected: 06/08/24 1600    Specimen: Blood Updated: 06/08/24 1601     Glucose 259 mg/dL     POC Glucose Once [[id number]]  (Abnormal) Collected: 06/08/24 1143    Specimen: Blood Updated: 06/08/24 1145     Glucose 176 mg/dL     Potassium [[id number]]  (Normal) Collected: 06/08/24 1014    Specimen: Blood Updated: 06/08/24 1044     Potassium 3.5 mmol/L              Imaging:  Imaging Results (Last 24 Hours)       ** No results found for the last 24 hours. **             Assessment:     Urinary tract infection  Perinephric abscess    Plan:   - No acute urologic surgical intervention planned  - Agree with general surgery- recommend IR evaluation for drainage of abscess  - Continue antibiotics per the recommendation of ID  - Urology will follow    BRANDY Rojo  06/09/24  08:06 EDT    Plan reviewed and discussed with Dr. Bird        Electronically signed by Madhav Bird MD at 06/09/24 5901

## 2024-06-10 NOTE — DISCHARGE SUMMARY
Patient elected to leave AGAINST MEDICAL ADVICE.  She was counseled on the need for continuing antibiotic therapy drainage of this abscess and ongoing treatment.  She did not wish to stay any longer.  Per discussion with Dr. Barrera patient was capable to make her own decisions and she signed out AGAINST MEDICAL ADVICE

## 2024-06-12 LAB
BACTERIA SPEC AEROBE CULT: NORMAL
BACTERIA SPEC AEROBE CULT: NORMAL

## 2024-06-18 NOTE — PAYOR COMM NOTE
"Nia SCALES (54 y.o. Female)     PLEASE SEE ATTACHED FOR DC NOTICE    REF #   [id number]     THANK YOU  RAUL ARCHULETA RN/ DEPT  [facility]   [phone]  [phone]        Date of Birth   1970    Social Security Number       [facility] [address] [place] [address]    Home Phone   [phone]    MRN   [id number]       Caodaism   None    Marital Status                               Admission Date   6/7/24    Admission Type   Urgent    Admitting Provider   South Aguayo MD    Attending Provider       Department, Room/Bed   [facility], S607/1       Discharge Date   6/9/2024    Discharge Disposition   Left Against Medical Advice    Discharge Destination                                 Attending Provider: (none)   Allergies: No Known Allergies    Isolation: None   Infection: None   Code Status: Prior    Ht: 167.6 cm (66\")   Wt: 90 kg (198 lb 6.6 oz)    Admission Cmt: None   Principal Problem: Perinephric abscess [N15.1]                   Active Insurance as of 6/7/2024       Primary Coverage       Payor Plan Insurance Group Employer/Plan Group    [employer] BLUE [employer]OSS [employer] BLUE [employer] BLUE [employer] PPO [id number]       Payor Plan Address Payor Plan Phone Number Payor Plan Fax Number Effective Dates    [address] [phone]  6/1/2023 - None Entered    [place] [address]         Subscriber Name Subscriber Birth Date Member ID       FLORENCIO SCALES 10/18/1960 [id number]                     Emergency Contacts        (Rel.) Home Phone Work Phone Mobile Phone    FLORENCIO SCALES (Spouse) -- -- [phone]              [place]: [facility] [id number]  Tax ID [id number]     Discharge Summary        Efren George MD at 06/09/24 1442          Patient elected to leave AGAINST MEDICAL ADVICE.  She was counseled on the need for continuing antibiotic therapy drainage of this abscess and ongoing treatment.  She did not wish to stay " any longer.  Per discussion with Dr. Barrera patient was capable to make her own decisions and she signed out AGAINST MEDICAL ADVICE    Electronically signed by Efren George MD at 06/10/24 1423       Discharge Order (From admission, onward)      None

## 2024-07-05 ENCOUNTER — APPOINTMENT (OUTPATIENT)
Dept: GENERAL RADIOLOGY | Facility: HOSPITAL | Age: 54
End: 2024-07-05
Payer: COMMERCIAL

## 2024-07-05 ENCOUNTER — APPOINTMENT (OUTPATIENT)
Dept: CT IMAGING | Facility: HOSPITAL | Age: 54
End: 2024-07-05
Payer: COMMERCIAL

## 2024-07-05 ENCOUNTER — HOSPITAL ENCOUNTER (INPATIENT)
Facility: HOSPITAL | Age: 54
LOS: 1 days | Discharge: ANOTHER HOSPITAL | End: 2024-07-06
Attending: EMERGENCY MEDICINE | Admitting: STUDENT IN AN ORGANIZED HEALTH CARE EDUCATION/TRAINING PROGRAM
Payer: COMMERCIAL

## 2024-07-05 DIAGNOSIS — E87.6 HYPOKALEMIA: ICD-10-CM

## 2024-07-05 DIAGNOSIS — K68.19 RETROPERITONEAL ABSCESS: ICD-10-CM

## 2024-07-05 DIAGNOSIS — A41.9 SEPSIS, DUE TO UNSPECIFIED ORGANISM, UNSPECIFIED WHETHER ACUTE ORGAN DYSFUNCTION PRESENT: Primary | ICD-10-CM

## 2024-07-05 DIAGNOSIS — N30.01 ACUTE CYSTITIS WITH HEMATURIA: ICD-10-CM

## 2024-07-05 DIAGNOSIS — E87.1 HYPONATREMIA: ICD-10-CM

## 2024-07-05 DIAGNOSIS — E11.65 HYPERGLYCEMIA DUE TO DIABETES MELLITUS: ICD-10-CM

## 2024-07-05 LAB
ACETONE BLD QL: NEGATIVE
ALBUMIN SERPL-MCNC: 2.9 G/DL (ref 3.5–5.2)
ALBUMIN/GLOB SERPL: 0.6 G/DL
ALP SERPL-CCNC: 193 U/L (ref 39–117)
ALT SERPL W P-5'-P-CCNC: 25 U/L (ref 1–33)
ANION GAP SERPL CALCULATED.3IONS-SCNC: 14.7 MMOL/L (ref 5–15)
AST SERPL-CCNC: 21 U/L (ref 1–32)
BACTERIA UR QL AUTO: ABNORMAL /HPF
BASOPHILS # BLD AUTO: 0.04 10*3/MM3 (ref 0–0.2)
BASOPHILS NFR BLD AUTO: 0.2 % (ref 0–1.5)
BILIRUB SERPL-MCNC: 0.4 MG/DL (ref 0–1.2)
BILIRUB UR QL STRIP: NEGATIVE
BUN SERPL-MCNC: 10 MG/DL (ref 6–20)
BUN/CREAT SERPL: 14.7 (ref 7–25)
CALCIUM SPEC-SCNC: 8.7 MG/DL (ref 8.6–10.5)
CHLORIDE SERPL-SCNC: 91 MMOL/L (ref 98–107)
CLARITY UR: ABNORMAL
CO2 SERPL-SCNC: 26.3 MMOL/L (ref 22–29)
COLOR UR: ABNORMAL
CREAT SERPL-MCNC: 0.68 MG/DL (ref 0.57–1)
D-LACTATE SERPL-SCNC: 1.9 MMOL/L (ref 0.5–2)
DEPRECATED RDW RBC AUTO: 40.9 FL (ref 37–54)
EGFRCR SERPLBLD CKD-EPI 2021: 102.4 ML/MIN/1.73
EOSINOPHIL # BLD AUTO: 0.07 10*3/MM3 (ref 0–0.4)
EOSINOPHIL NFR BLD AUTO: 0.3 % (ref 0.3–6.2)
ERYTHROCYTE [DISTWIDTH] IN BLOOD BY AUTOMATED COUNT: 13.4 % (ref 12.3–15.4)
GLOBULIN UR ELPH-MCNC: 4.5 GM/DL
GLUCOSE SERPL-MCNC: 301 MG/DL (ref 65–99)
GLUCOSE UR STRIP-MCNC: NEGATIVE MG/DL
HCT VFR BLD AUTO: 32.5 % (ref 34–46.6)
HGB BLD-MCNC: 10.2 G/DL (ref 12–15.9)
HGB UR QL STRIP.AUTO: ABNORMAL
HOLD SPECIMEN: NORMAL
HOLD SPECIMEN: NORMAL
HYALINE CASTS UR QL AUTO: ABNORMAL /LPF
IMM GRANULOCYTES # BLD AUTO: 0.2 10*3/MM3 (ref 0–0.05)
IMM GRANULOCYTES NFR BLD AUTO: 0.9 % (ref 0–0.5)
KETONES UR QL STRIP: ABNORMAL
LEUKOCYTE ESTERASE UR QL STRIP.AUTO: ABNORMAL
LYMPHOCYTES # BLD AUTO: 1.18 10*3/MM3 (ref 0.7–3.1)
LYMPHOCYTES NFR BLD AUTO: 5.4 % (ref 19.6–45.3)
MAGNESIUM SERPL-MCNC: 1.7 MG/DL (ref 1.6–2.6)
MCH RBC QN AUTO: 26.4 PG (ref 26.6–33)
MCHC RBC AUTO-ENTMCNC: 31.4 G/DL (ref 31.5–35.7)
MCV RBC AUTO: 84 FL (ref 79–97)
MONOCYTES # BLD AUTO: 0.61 10*3/MM3 (ref 0.1–0.9)
MONOCYTES NFR BLD AUTO: 2.8 % (ref 5–12)
NEUTROPHILS NFR BLD AUTO: 19.63 10*3/MM3 (ref 1.7–7)
NEUTROPHILS NFR BLD AUTO: 90.4 % (ref 42.7–76)
NITRITE UR QL STRIP: POSITIVE
NRBC BLD AUTO-RTO: 0 /100 WBC (ref 0–0.2)
PH UR STRIP.AUTO: 6.5 [PH] (ref 5–8)
PLATELET # BLD AUTO: 640 10*3/MM3 (ref 140–450)
PMV BLD AUTO: 9.8 FL (ref 6–12)
POTASSIUM SERPL-SCNC: 2.8 MMOL/L (ref 3.5–5.2)
PROT SERPL-MCNC: 7.4 G/DL (ref 6–8.5)
PROT UR QL STRIP: ABNORMAL
RBC # BLD AUTO: 3.87 10*6/MM3 (ref 3.77–5.28)
RBC # UR STRIP: ABNORMAL /HPF
REF LAB TEST METHOD: ABNORMAL
SODIUM SERPL-SCNC: 132 MMOL/L (ref 136–145)
SP GR UR STRIP: 1.01 (ref 1–1.03)
SQUAMOUS #/AREA URNS HPF: ABNORMAL /HPF
UROBILINOGEN UR QL STRIP: ABNORMAL
WBC # UR STRIP: ABNORMAL /HPF
WBC NRBC COR # BLD AUTO: 21.73 10*3/MM3 (ref 3.4–10.8)
WHOLE BLOOD HOLD COAG: NORMAL
WHOLE BLOOD HOLD SPECIMEN: NORMAL

## 2024-07-05 PROCEDURE — 93005 ELECTROCARDIOGRAM TRACING: CPT

## 2024-07-05 PROCEDURE — 87040 BLOOD CULTURE FOR BACTERIA: CPT | Performed by: EMERGENCY MEDICINE

## 2024-07-05 PROCEDURE — 83605 ASSAY OF LACTIC ACID: CPT | Performed by: EMERGENCY MEDICINE

## 2024-07-05 PROCEDURE — 25010000002 POTASSIUM CHLORIDE 10 MEQ/100ML SOLUTION

## 2024-07-05 PROCEDURE — 71045 X-RAY EXAM CHEST 1 VIEW: CPT

## 2024-07-05 PROCEDURE — 25010000002 CEFTRIAXONE PER 250 MG

## 2024-07-05 PROCEDURE — 81001 URINALYSIS AUTO W/SCOPE: CPT | Performed by: EMERGENCY MEDICINE

## 2024-07-05 PROCEDURE — 25810000003 SODIUM CHLORIDE 0.9 % SOLUTION

## 2024-07-05 PROCEDURE — 74177 CT ABD & PELVIS W/CONTRAST: CPT

## 2024-07-05 PROCEDURE — 82009 KETONE BODYS QUAL: CPT

## 2024-07-05 PROCEDURE — 83735 ASSAY OF MAGNESIUM: CPT

## 2024-07-05 PROCEDURE — 25010000002 MORPHINE PER 10 MG: Performed by: EMERGENCY MEDICINE

## 2024-07-05 PROCEDURE — 99285 EMERGENCY DEPT VISIT HI MDM: CPT

## 2024-07-05 PROCEDURE — 87086 URINE CULTURE/COLONY COUNT: CPT | Performed by: EMERGENCY MEDICINE

## 2024-07-05 PROCEDURE — 25010000002 VANCOMYCIN 5 G RECONSTITUTED SOLUTION

## 2024-07-05 PROCEDURE — 25510000001 IOPAMIDOL PER 1 ML: Performed by: EMERGENCY MEDICINE

## 2024-07-05 PROCEDURE — 87186 SC STD MICRODIL/AGAR DIL: CPT | Performed by: EMERGENCY MEDICINE

## 2024-07-05 PROCEDURE — 25010000002 ACETAMINOPHEN 10 MG/ML SOLUTION

## 2024-07-05 PROCEDURE — 80053 COMPREHEN METABOLIC PANEL: CPT | Performed by: EMERGENCY MEDICINE

## 2024-07-05 PROCEDURE — 87147 CULTURE TYPE IMMUNOLOGIC: CPT | Performed by: EMERGENCY MEDICINE

## 2024-07-05 PROCEDURE — 99222 1ST HOSP IP/OBS MODERATE 55: CPT | Performed by: STUDENT IN AN ORGANIZED HEALTH CARE EDUCATION/TRAINING PROGRAM

## 2024-07-05 PROCEDURE — 93010 ELECTROCARDIOGRAM REPORT: CPT | Performed by: INTERNAL MEDICINE

## 2024-07-05 PROCEDURE — 85025 COMPLETE CBC W/AUTO DIFF WBC: CPT | Performed by: EMERGENCY MEDICINE

## 2024-07-05 PROCEDURE — 36415 COLL VENOUS BLD VENIPUNCTURE: CPT

## 2024-07-05 RX ORDER — POTASSIUM CHLORIDE 7.45 MG/ML
10 INJECTION INTRAVENOUS
Status: DISPENSED | OUTPATIENT
Start: 2024-07-05 | End: 2024-07-05

## 2024-07-05 RX ORDER — VANCOMYCIN/0.9 % SOD CHLORIDE 1.5G/250ML
20 PLASTIC BAG, INJECTION (ML) INTRAVENOUS ONCE
Status: COMPLETED | OUTPATIENT
Start: 2024-07-05 | End: 2024-07-05

## 2024-07-05 RX ORDER — MORPHINE SULFATE 2 MG/ML
2 INJECTION, SOLUTION INTRAMUSCULAR; INTRAVENOUS ONCE
Status: COMPLETED | OUTPATIENT
Start: 2024-07-05 | End: 2024-07-05

## 2024-07-05 RX ORDER — ACETAMINOPHEN 10 MG/ML
1000 INJECTION, SOLUTION INTRAVENOUS ONCE
Status: COMPLETED | OUTPATIENT
Start: 2024-07-05 | End: 2024-07-05

## 2024-07-05 RX ORDER — SODIUM CHLORIDE 0.9 % (FLUSH) 0.9 %
10 SYRINGE (ML) INJECTION AS NEEDED
Status: DISCONTINUED | OUTPATIENT
Start: 2024-07-05 | End: 2024-07-06 | Stop reason: HOSPADM

## 2024-07-05 RX ADMIN — SODIUM CHLORIDE 2418 ML: 9 INJECTION, SOLUTION INTRAVENOUS at 16:27

## 2024-07-05 RX ADMIN — VANCOMYCIN HYDROCHLORIDE 1500 MG: 5 INJECTION, POWDER, LYOPHILIZED, FOR SOLUTION INTRAVENOUS at 16:31

## 2024-07-05 RX ADMIN — IOPAMIDOL 100 ML: 755 INJECTION, SOLUTION INTRAVENOUS at 15:48

## 2024-07-05 RX ADMIN — CEFTRIAXONE SODIUM 2000 MG: 2 INJECTION, POWDER, FOR SOLUTION INTRAMUSCULAR; INTRAVENOUS at 16:23

## 2024-07-05 RX ADMIN — MORPHINE SULFATE 2 MG: 2 INJECTION, SOLUTION INTRAMUSCULAR; INTRAVENOUS at 16:19

## 2024-07-05 RX ADMIN — POTASSIUM CHLORIDE 10 MEQ: 7.46 INJECTION, SOLUTION INTRAVENOUS at 16:57

## 2024-07-05 RX ADMIN — ACETAMINOPHEN 1000 MG: 10 INJECTION INTRAVENOUS at 16:30

## 2024-07-05 NOTE — ED PROVIDER NOTES
Time: 2:49 PM EDT  Date of encounter:  7/5/2024  Independent Historian/Clinical History and Information was obtained by:   Patient    History is limited by: N/A    Chief Complaint: right sided flank pain      History of Present Illness:  Patient is a 54 y.o. year old female who presents to the emergency department via EMS for evaluation of right sided flank pain. This has been getting worse for the last two weeks. She reports subjective fever. She has had nausea and diarrhea. Hx of CCY. Per nurse, patient was covered in feces. States that she has been soiling herself at home and her  cleans her up. Patient states that she has not been able to walk.    Upon reviewing the patient's chart, she was seen in [facility] on 6/6/24 where she was found to have perinephric abscess, hyperglycemia, UTI, leukocystosis, sepsis. She was transferred to [facility] on 6/7/24. At [facility], she was seen by ID and General Surgery who had recommended to consult IR to drain the abscess. She was also given IV Rocephin 2g. However, she decided to leave AMA on 6/9/24.     HPI    Patient Care Team  Primary Care Provider: Provider, No Known    Past Medical History:     No Known Allergies  History reviewed. No pertinent past medical history.  Past Surgical History:   Procedure Laterality Date    CHOLECYSTECTOMY       Family History   Problem Relation Age of Onset    Cancer Mother     Alcohol abuse Mother        Home Medications:  Prior to Admission medications    Medication Sig Start Date End Date Taking? Authorizing Provider   acetaminophen (TYLENOL) 325 MG tablet Take 2 tablets by mouth Every 4 (Four) Hours As Needed for Mild Pain.    Provider, Historical, MD        Social History:   Social History     Tobacco Use    Smoking status: Every Day     Current packs/day: 1.00     Average packs/day: 1 pack/day for 42.5 years (42.5 ttl pk-yrs)     Types: Cigarettes     Start date: 1982    Smokeless tobacco: Never  "  Vaping Use    Vaping status: Never Used   Substance Use Topics    Alcohol use: Never    Drug use: Never         Review of Systems:  Review of Systems   Constitutional:  Positive for chills and fever.   HENT:  Negative for ear pain.    Eyes:  Negative for pain.   Respiratory:  Negative for cough and shortness of breath.    Cardiovascular:  Negative for chest pain.   Gastrointestinal:  Positive for abdominal pain, diarrhea and nausea. Negative for vomiting.   Genitourinary:  Positive for flank pain. Negative for dysuria.   Musculoskeletal:  Negative for arthralgias.   Skin:  Negative for rash.   Neurological:  Negative for headaches.        Physical Exam:  /65 (BP Location: Left arm, Patient Position: Lying)   Pulse 88   Temp 100.1 °F (37.8 °C) (Oral)   Resp 23   Ht 167.6 cm (66\")   Wt 80.6 kg (177 lb 11.1 oz)   LMP  (LMP Unknown)   SpO2 96%   Breastfeeding No   BMI 28.68 kg/m²     Physical Exam  Vitals and nursing note reviewed.   Constitutional:       Appearance: Normal appearance.   HENT:      Head: Normocephalic and atraumatic.      Nose: Nose normal.   Eyes:      Extraocular Movements: Extraocular movements intact.      Conjunctiva/sclera: Conjunctivae normal.      Pupils: Pupils are equal, round, and reactive to light.   Cardiovascular:      Rate and Rhythm: Normal rate and regular rhythm.      Pulses: Normal pulses.      Heart sounds: Normal heart sounds.   Pulmonary:      Effort: Pulmonary effort is normal.      Breath sounds: Normal breath sounds.   Abdominal:      General: Abdomen is flat.      Palpations: Abdomen is soft.      Tenderness: There is generalized abdominal tenderness. There is right CVA tenderness.   Musculoskeletal:         General: Normal range of motion.      Cervical back: Normal range of motion and neck supple.   Skin:     General: Skin is warm and dry.   Neurological:      General: No focal deficit present.      Mental Status: She is alert and oriented to person, place, " and time.   Psychiatric:         Mood and Affect: Mood normal.         Behavior: Behavior normal.         Thought Content: Thought content normal.         Judgment: Judgment normal.                  Procedures:  Procedures      Medical Decision Making:      Comorbidities that affect care:    Diabetes, Obesity    External Notes reviewed:    Previous ED Note: Reviewed ED note on 6/6/24; Reviewed hospital admission note on 6/9/24      The following orders were placed and all results were independently analyzed by me:  Orders Placed This Encounter   Procedures    Blood Culture - Blood,    Blood Culture - Blood,    Urine Culture - Urine,    CT Abdomen Pelvis With Contrast    XR Chest 1 View    Comprehensive Metabolic Panel    Lactic Acid, Plasma    [place] Draw    Urinalysis With Culture If Indicated - Urine, Clean Catch    CBC Auto Differential    Acetone    Urinalysis, Microscopic Only - Urine, Clean Catch    Magnesium    Undress & Gown    Continuous Pulse Oximetry    Vital Signs    Inpatient General Surgery Consult    Inpatient Hospitalist Consult    Oxygen Therapy- Nasal Cannula; Titrate 1-6 LPM Per SpO2; 90 - 95%    ECG 12 Lead Other; hypokalemia    Insert Peripheral IV    Inpatient Admission    CBC & Differential    Green Top (Gel)    Lavender Top    Gold Top - SST    Light Blue Top       Medications Given in the Emergency Department:  Medications   sodium chloride 0.9 % flush 10 mL (has no administration in time range)   potassium chloride 10 mEq in 100 mL IVPB (10 mEq Intravenous Not Given 7/5/24 2145)   sodium chloride 0.9 % bolus 2,418 mL (0 mL Intravenous Stopped 7/5/24 1912)   morphine injection 2 mg (2 mg Intravenous Given 7/5/24 1619)   cefTRIAXone (ROCEPHIN) 2,000 mg in sodium chloride 0.9 % 100 mL IVPB-VTB (0 mg Intravenous Stopped 7/5/24 1641)   vancomycin IVPB 1500 mg in 0.9% NaCl (Premix) 500 mL (0 mg Intravenous Stopped 7/5/24 1912)   acetaminophen (OFIRMEV) injection 1,000 mg (1,000 mg Intravenous  Given 7/5/24 1630)   iopamidol (ISOVUE-370) 76 % injection 100 mL (100 mL Intravenous Given 7/5/24 1548)        ED Course:    ED Course as of 07/05/24 2319 Fri Jul 05, 2024   1548 WBC(!): 21.73  Patient meets SIRS criteria. Started IV Abx and sepsis fluids. [MV]   1639 XR Chest 1 View  Negative [MV]   1639 CT Abdomen Pelvis With Contrast  Impression:  1.Enlarging right retroperitoneal abscess centered in the psoas muscle. There is now extension into the right paraspinal musculature.  2.Other stable chronic findings.   [MV]   2316 Per Nurse, [facility] does not have any beds overnight and will have to get admitted to us until they have a room tomorrow. I talked to Dr. Walls who is accepting the patient. [MV]      ED Course User Index  [MV] Tom Reyes PA       Labs:    Lab Results (last 24 hours)       Procedure Component Value Units Date/Time    Urinalysis With Culture If Indicated - Urine, Clean Catch [[id number]]  (Abnormal) Collected: 07/05/24 1444    Specimen: Urine, Clean Catch Updated: 07/05/24 1527     Color, UA Dark Yellow     Appearance, UA Turbid     pH, UA 6.5     Specific Gravity, UA 1.014     Glucose, UA Negative     Ketones, UA Trace     Bilirubin, UA Negative     Blood, UA Moderate (2+)     Protein,  mg/dL (2+)     Leuk Esterase, UA Large (3+)     Nitrite, UA Positive     Urobilinogen, UA 1.0 E.U./dL    Narrative:      In absence of clinical symptoms, the presence of pyuria, bacteria, and/or nitrites on the urinalysis result does not correlate with infection.    Urinalysis, Microscopic Only - Urine, Clean Catch [[id number]]  (Abnormal) Collected: 07/05/24 1444    Specimen: Urine, Clean Catch Updated: 07/05/24 1540     RBC, UA       Unable to determine due to loaded field     /HPF     WBC, UA Too Numerous to Count /HPF      Bacteria, UA 4+ /HPF      Squamous Epithelial Cells, UA       Unable to determine due to loaded field     /HPF     Hyaline Casts, UA       Unable to determine due to loaded  field     /[facility]     Methodology Manual Light Microscopy    Urine Culture - Urine, Urine, Clean Catch [[id number]] Collected: 07/05/24 1444    Specimen: Urine, Clean Catch Updated: 07/05/24 1540    CBC & Differential [[id number]]  (Abnormal) Collected: 07/05/24 1522    Specimen: Blood Updated: 07/05/24 1543    Narrative:      The following orders were created for panel order CBC & Differential.  Procedure                               Abnormality         Status                     ---------                               -----------         ------                     CBC Auto Differential[[id number]]        Abnormal            Final result                 Please view results for these tests on the individual orders.    Comprehensive Metabolic Panel [[id number]]  (Abnormal) Collected: 07/05/24 1522    Specimen: Blood Updated: 07/05/24 1551     Glucose 301 mg/dL      BUN 10 mg/dL      Creatinine 0.68 mg/dL      Sodium 132 mmol/L      Potassium 2.8 mmol/L      Chloride 91 mmol/L      CO2 26.3 mmol/L      Calcium 8.7 mg/dL      Total Protein 7.4 g/dL      Albumin 2.9 g/dL      ALT (SGPT) 25 U/L      AST (SGOT) 21 U/L      Alkaline Phosphatase 193 U/L      Total Bilirubin 0.4 mg/dL      Globulin 4.5 gm/dL      A/G Ratio 0.6 g/dL      BUN/Creatinine Ratio 14.7     Anion Gap 14.7 mmol/L      eGFR 102.4 mL/min/1.73     Narrative:      GFR Normal >60  Chronic Kidney Disease <60  Kidney Failure <15      Lactic Acid, Plasma [[id number]]  (Normal) Collected: 07/05/24 1522    Specimen: Blood Updated: 07/05/24 1549     Lactate 1.9 mmol/L     Blood Culture - Blood, Arm, Left [[id number]] Collected: 07/05/24 1522    Specimen: Blood from Arm, Left Updated: 07/05/24 1527    Blood Culture - Blood, Arm, Left [[id number]] Collected: 07/05/24 1522    Specimen: Blood from Arm, Left Updated: 07/05/24 1528    CBC Auto Differential [[id number]]  (Abnormal) Collected: 07/05/24 1522    Specimen: Blood Updated: 07/05/24 1543     WBC 21.73 10*3/mm3       RBC 3.87 10*6/mm3      Hemoglobin 10.2 g/dL      Hematocrit 32.5 %      MCV 84.0 fL      MCH 26.4 pg      MCHC 31.4 g/dL      RDW 13.4 %      RDW-SD 40.9 fl      MPV 9.8 fL      Platelets 640 10*3/mm3      Neutrophil % 90.4 %      Lymphocyte % 5.4 %      Monocyte % 2.8 %      Eosinophil % 0.3 %      Basophil % 0.2 %      Immature Grans % 0.9 %      Neutrophils, Absolute 19.63 10*3/mm3      Lymphocytes, Absolute 1.18 10*3/mm3      Monocytes, Absolute 0.61 10*3/mm3      Eosinophils, Absolute 0.07 10*3/mm3      Basophils, Absolute 0.04 10*3/mm3      Immature Grans, Absolute 0.20 10*3/mm3      nRBC 0.0 /100 WBC     Acetone [[id number]]  (Normal) Collected: 07/05/24 1522    Specimen: Blood Updated: 07/05/24 1539     Acetone Negative    Magnesium [[id number]]  (Normal) Collected: 07/05/24 1522    Specimen: Blood Updated: 07/05/24 1709     Magnesium 1.7 mg/dL              Imaging:    XR Chest 1 View    Result Date: 7/5/2024  XR CHEST 1 VW Date of Exam: 7/5/2024 4:27 PM EDT Indication: leukocytosis, sepsis Comparison: None available. Findings: Unremarkable cardiomediastinal silhouette. Low lung volumes. No focal airspace opacity. No pleural effusion or pneumothorax. No acute osseous abnormality.     Impression: No focal airspace consolidation. Electronically Signed: Ralf Gallagher MD  7/5/2024 4:31 PM EDT  Workstation ID: [id number]    CT Abdomen Pelvis With Contrast    Result Date: 7/5/2024  CT ABDOMEN PELVIS W CONTRAST Date of Exam: 7/5/2024 3:26 PM EDT Indication: flank pain. Comparison: 6/9/2024 Technique: Axial CT images were obtained of the abdomen and pelvis after the uneventful intravenous administration of iodinated contrast. Reconstructed coronal and sagittal images were also obtained. Automated exposure control and iterative construction methods were used. Findings: LUNG BASES:  Unremarkable without mass or infiltrate. LIVER:  Unremarkable parenchyma without focal lesion. BILIARY/GALLBLADDER: Cholecystectomy SPLEEN:   Unremarkable PANCREAS:  Unremarkable ADRENAL:  Unremarkable KIDNEYS: Anterior displacement of the right kidney secondary to retroperitoneal fluid collection. There are few small increased density foci within the lower pole of the right kidney, likely nonobstructive calculi. Unremarkable parenchyma with no solid mass identified. No obstruction.  No calculus identified. GASTROINTESTINAL/MESENTERY:  No evidence of obstruction nor inflammation.  MESENTERIC VESSELS:  Patent. AORTA/IVC:  Normal caliber. RETROPERITONEUM/LYMPH NODES:  There is an enlarging multiseptated rim-enhancing fluid collection centered on the right psoas muscle resulting in anterior displacement the right kidney. This measures 11.1 cm transverse dimension by 8.9 cm in AP dimension by 18.6 cm in CC dimension, previously 7.9 x 6.3 x 13.2 cm. There is now extension into the right paraspinal musculature at the L3/4 level. There are no enlarged lymph nodes identified. REPRODUCTIVE:  Unremarkable BLADDER:  Unremarkable OSSEUS STRUCTURES:  Typical for age with no acute process identified.     Impression: 1.Enlarging right retroperitoneal abscess centered in the psoas muscle. There is now extension into the right paraspinal musculature. 2.Other stable chronic findings. Electronically Signed: Oscar Callejas MD  7/5/2024 4:04 PM EDT  Workstation ID: [id number]       Differential Diagnosis and Discussion:    Flank Pain: Differential diagnosis includes but is not limited to kidney stones, pyelonephritis, musculoskeletal disorders, renal infarction, urinary tract infection, hydronephrosis, radiculopathy, aortic aneurysm, renal cell carcinoma.    All labs were reviewed and interpreted by me.  All X-rays impressions were independently interpreted by me.  EKG was interpreted by supervising attending.  CT scan radiology impression was interpreted by me.    MDM     Amount and/or Complexity of Data Reviewed  Clinical lab tests: reviewed and ordered  Tests in the radiology  section of CPT®: ordered and reviewed  Decide to obtain previous medical records or to obtain history from someone other than the patient: yes    Risk of Complications, Morbidity, and/or Mortality  Presenting problems: high  Diagnostic procedures: moderate  Management options: moderate    Patient presents to the emergency department via EMS for evaluation of right sided flank pain. This has been getting worse for the last two weeks. She reports subjective fever. She has had nausea and diarrhea. Hx of CCY. Per nurse, patient was covered in feces. States that she has been soiling herself at home and her  cleans her up. Patient states that she has not been able to walk.    Upon reviewing the patient's chart, she was seen in [facility] on 6/6/24 where she was found to have perinephric abscess, hyperglycemia, UTI, leukocystosis, sepsis. She was transferred to [facility] on 6/7/24. At [facility], she was seen by ID and General Surgery who had recommended to consult IR to drain the abscess. She was also given IV Rocephin 2g. However, she decided to leave [place] on 6/9/24.     On exam, lungs are clear to auscultation bilaterally.  Patient is tender to palpation to the right flank and right-sided abdomen.    CBC shows an elevated white count of 21.73.  Platelets also elevated at 640.  Hemoglobin is at baseline at 10.3.  CMP shows an elevated glucose of 301.  Sodium is low at 132 and potassium is low at 2.8.  Elevated alk phos at 193.  Rest of the CMP is unremarkable.    Acetone is negative.  Lactate 1.9.  A1c 14.4 on 6/7/2024    UA is positive for large amount of bacteria and blood.    XR Chest 1 View   Final Result   Impression:   No focal airspace consolidation.         Electronically Signed: Ralf Gallagher MD     7/5/2024 4:31 PM EDT     Workstation ID: [id number]      CT Abdomen Pelvis With Contrast   Final Result   Impression:   1.Enlarging right retroperitoneal abscess centered in the psoas  muscle. There is now extension into the right paraspinal musculature.   2.Other stable chronic findings.            Electronically Signed: Oscar Callejas MD     7/5/2024 4:04 PM EDT     Workstation ID: [id number]        Discussed this patient with Dr. Gan. He recommends calling [facility] to transfer the patient.  Discussed this patient with Dr. Powell, General Surgery from [facility]. He recommends admitting to the hospitalist team. Okay to consult if needed.  Discussed this patient with Dr. Kirkpatrick, [facility] Hospitalist. Okay to admit patient to telemetry.    Per Nurse, [facility] does not have any beds overnight and will have to get admitted to us until they have a room tomorrow. I talked to Dr. Walls who is accepting the patient.      Sepsis criteria was met in the emergency department and the Sepsis protocol (including antibiotic administration) was initiated.      SIRS criteria considered:   1.  Temperature > 100.4 or <96.8    2.  Heart Rate > 90    3.  Respiratory Rate > 22    4.  WBC > 12K or <4K.             Severe Sepsis:     Respiratory: Mechanical Ventilation or Bipap  Hypotension: SBP > 90 or MAP < 65  Renal: Creatinine > 2  Metabolic: Lactic Acid > 2  Hematologic: Platelets < 100K or INR > 1.5  Hepatic: BILI  >  2  CNS: Sudden AMS     Septic Shock:     Severe Sepsis + Persistent hypotension or Lactic Acid > 4     Normal saline bolus, Antibiotics, and final disposition was based on these definitions.        Sepsis was recognized at 1522    Antibiotics were ordered.     30 cc/kg bolus was was indicated.       Total Critical Care time of 35 minutes. Total critical care time documented does not include time spent on separately billed procedures for services of nurses or physician assistants. I personally saw and examined the patient. I have reviewed all diagnostic interpretations and treatment plans as written. I was present for the key portions of any procedures performed and the inclusive time noted in any critical  care statement. Critical care time includes patient management by me, time spent at the patients bedside,  time to review lab and imaging results, discussing patient care, documentation in the medical record, and time spent with family or caregiver.    Patient Care Considerations:    CT CHEST: I considered ordering a CT scan of the chest, however denies any SOA, lungs CTAB.      Consultants/Shared Management Plan:    SHARED VISIT: I have discussed the case with my supervising physician, Dr. Gan who statesthat patient may need to be transferred to [facility] for further treatment. The substantive portion of the medical decision was made by the attesting physician who made or approve the management plan and will take responsibility for the patient.  Clinical findings were discussed and ultimate disposition was made in consult with supervising physician.  Transfer Provider: I have discussed the case with Dr. Kirkpatrick, Hospitalist, at [facility] who agrees to accept the patient as a transfer.    Social Determinants of Health:    Patient has presented with family members who are responsible, reliable and will ensure follow up care.      Disposition and Care Coordination:    Transferred: Through independent evaluation of the patient's history, physical, and imperical data, the patient meets criteria to be transferred to another hospital for evaluation/admission.        Final diagnoses:   Sepsis, due to unspecified organism, unspecified whether acute organ dysfunction present   Acute cystitis with hematuria   Hypokalemia   Hyponatremia   Hyperglycemia due to diabetes mellitus   Retroperitoneal abscess        ED Disposition       ED Disposition   Decision to Admit    Condition   --    Comment   Level of Care: Progressive Care [20]   Diagnosis: Sepsis [[id number]]   Certification: I Certify That Inpatient Hospital Services Are Medically Necessary For Greater Than 2 Midnights                 This medical record created using  voice recognition software.             Tom Reyes PA  07/05/24 [id number]       Tom Reyes PA  07/05/24 [id number]

## 2024-07-05 NOTE — ED PROVIDER NOTES
"SHARED VISIT NOTE:    Patient is 54 y.o. year old female that presents to the ED for evaluation of flank pain.     Physical Exam    ED Course:    /65 (BP Location: Left arm, Patient Position: Lying)   Pulse 109   Temp (!) 100.9 °F (38.3 °C) (Rectal)   Resp 23   Ht 167.6 cm (66\")   Wt 80.6 kg (177 lb 11.1 oz)   LMP  (LMP Unknown)   SpO2 93%   Breastfeeding No   BMI 28.68 kg/m²   Results for orders placed or performed during the hospital encounter of 07/05/24   Comprehensive Metabolic Panel    Specimen: Blood   Result Value Ref Range    Glucose 301 (H) 65 - 99 mg/dL    BUN 10 6 - 20 mg/dL    Creatinine 0.68 0.57 - 1.00 mg/dL    Sodium 132 (L) 136 - 145 mmol/L    Potassium 2.8 (L) 3.5 - 5.2 mmol/L    Chloride 91 (L) 98 - 107 mmol/L    CO2 26.3 22.0 - 29.0 mmol/L    Calcium 8.7 8.6 - 10.5 mg/dL    Total Protein 7.4 6.0 - 8.5 g/dL    Albumin 2.9 (L) 3.5 - 5.2 g/dL    ALT (SGPT) 25 1 - 33 U/L    AST (SGOT) 21 1 - 32 U/L    Alkaline Phosphatase 193 (H) 39 - 117 U/L    Total Bilirubin 0.4 0.0 - 1.2 mg/dL    Globulin 4.5 gm/dL    A/G Ratio 0.6 g/dL    BUN/Creatinine Ratio 14.7 7.0 - 25.0    Anion Gap 14.7 5.0 - 15.0 mmol/L    eGFR 102.4 >60.0 mL/min/1.73   Lactic Acid, Plasma    Specimen: Blood   Result Value Ref Range    Lactate 1.9 0.5 - 2.0 mmol/L   Urinalysis With Culture If Indicated - Urine, Clean Catch    Specimen: Urine, Clean Catch   Result Value Ref Range    Color, UA Dark Yellow (A) Yellow, Straw    Appearance, UA Turbid (A) Clear    pH, UA 6.5 5.0 - 8.0    Specific Gravity, UA 1.014 1.005 - 1.030    Glucose, UA Negative Negative    Ketones, UA Trace (A) Negative    Bilirubin, UA Negative Negative    Blood, UA Moderate (2+) (A) Negative    Protein,  mg/dL (2+) (A) Negative    Leuk Esterase, UA Large (3+) (A) Negative    Nitrite, UA Positive (A) Negative    Urobilinogen, UA 1.0 E.U./dL 0.2 - 1.0 E.U./dL   CBC Auto Differential    Specimen: Blood   Result Value Ref Range    WBC 21.73 (H) 3.40 - " 10.80 10*3/mm3    RBC 3.87 3.77 - 5.28 10*6/mm3    Hemoglobin 10.2 (L) 12.0 - 15.9 g/dL    Hematocrit 32.5 (L) 34.0 - 46.6 %    MCV 84.0 79.0 - 97.0 fL    MCH 26.4 (L) 26.6 - 33.0 pg    MCHC 31.4 (L) 31.5 - 35.7 g/dL    RDW 13.4 12.3 - 15.4 %    RDW-SD 40.9 37.0 - 54.0 fl    MPV 9.8 6.0 - 12.0 fL    Platelets 640 (H) 140 - 450 10*3/mm3    Neutrophil % 90.4 (H) 42.7 - 76.0 %    Lymphocyte % 5.4 (L) 19.6 - 45.3 %    Monocyte % 2.8 (L) 5.0 - 12.0 %    Eosinophil % 0.3 0.3 - 6.2 %    Basophil % 0.2 0.0 - 1.5 %    Immature Grans % 0.9 (H) 0.0 - 0.5 %    Neutrophils, Absolute 19.63 (H) 1.70 - 7.00 10*3/mm3    Lymphocytes, Absolute 1.18 0.70 - 3.10 10*3/mm3    Monocytes, Absolute 0.61 0.10 - 0.90 10*3/mm3    Eosinophils, Absolute 0.07 0.00 - 0.40 10*3/mm3    Basophils, Absolute 0.04 0.00 - 0.20 10*3/mm3    Immature Grans, Absolute 0.20 (H) 0.00 - 0.05 10*3/mm3    nRBC 0.0 0.0 - 0.2 /100 WBC   Acetone    Specimen: Blood   Result Value Ref Range    Acetone Negative Negative   Urinalysis, Microscopic Only - Urine, Clean Catch    Specimen: Urine, Clean Catch   Result Value Ref Range    RBC, UA Unable to determine due to loaded field (A) None Seen, 0-2 /HPF    WBC, UA Too Numerous to Count (A) None Seen, 0-2 /HPF    Bacteria, UA 4+ (A) None Seen /HPF    Squamous Epithelial Cells, UA Unable to determine due to loaded field (A) None Seen, 0-2 /HPF    Hyaline Casts, UA Unable to determine due to loaded field None Seen /LPF    Methodology Manual Light Microscopy    Green Top (Gel)   Result Value Ref Range    Extra Tube Hold for add-ons.    Lavender Top   Result Value Ref Range    Extra Tube hold for add-on    Gold Top - SST   Result Value Ref Range    Extra Tube Hold for add-ons.    Light Blue Top   Result Value Ref Range    Extra Tube Hold for add-ons.      Medications   sodium chloride 0.9 % flush 10 mL (has no administration in time range)   sodium chloride 0.9 % bolus 2,418 mL (has no administration in time range)   morphine  injection 2 mg (has no administration in time range)   cefTRIAXone (ROCEPHIN) 2,000 mg in sodium chloride 0.9 % 100 mL IVPB-VTB (has no administration in time range)   vancomycin IVPB 1500 mg in 0.9% NaCl (Premix) 500 mL (has no administration in time range)   acetaminophen (OFIRMEV) injection 1,000 mg (has no administration in time range)   potassium chloride 10 mEq in 100 mL IVPB (has no administration in time range)   iopamidol (ISOVUE-370) 76 % injection 100 mL (100 mL Intravenous Given 7/5/24 1548)     CT Abdomen Pelvis With Contrast    Result Date: 7/5/2024  Narrative: CT ABDOMEN PELVIS W CONTRAST Date of Exam: 7/5/2024 3:26 PM EDT Indication: flank pain. Comparison: 6/9/2024 Technique: Axial CT images were obtained of the abdomen and pelvis after the uneventful intravenous administration of iodinated contrast. Reconstructed coronal and sagittal images were also obtained. Automated exposure control and iterative construction methods were used. Findings: LUNG BASES:  Unremarkable without mass or infiltrate. LIVER:  Unremarkable parenchyma without focal lesion. BILIARY/GALLBLADDER: Cholecystectomy SPLEEN:  Unremarkable PANCREAS:  Unremarkable ADRENAL:  Unremarkable KIDNEYS: Anterior displacement of the right kidney secondary to retroperitoneal fluid collection. There are few small increased density foci within the lower pole of the right kidney, likely nonobstructive calculi. Unremarkable parenchyma with no solid mass identified. No obstruction.  No calculus identified. GASTROINTESTINAL/MESENTERY:  No evidence of obstruction nor inflammation.  MESENTERIC VESSELS:  Patent. AORTA/IVC:  Normal caliber. RETROPERITONEUM/LYMPH NODES:  There is an enlarging multiseptated rim-enhancing fluid collection centered on the right psoas muscle resulting in anterior displacement the right kidney. This measures 11.1 cm transverse dimension by 8.9 cm in AP dimension by 18.6 cm in CC dimension, previously 7.9 x 6.3 x 13.2 cm.  There is now extension into the right paraspinal musculature at the L3/4 level. There are no enlarged lymph nodes identified. REPRODUCTIVE:  Unremarkable BLADDER:  Unremarkable OSSEUS STRUCTURES:  Typical for age with no acute process identified.     Impression: Impression: 1.Enlarging right retroperitoneal abscess centered in the psoas muscle. There is now extension into the right paraspinal musculature. 2.Other stable chronic findings. Electronically Signed: Oscar Callejas MD  7/5/2024 4:04 PM EDT  Workstation ID: [id number]    CT Abdomen Pelvis With & Without Contrast    Result Date: 6/9/2024  Narrative: CT ABDOMEN PELVIS W WO CONTRAST-  INDICATION: Renal abscess  COMPARISON: None  TECHNIQUE: Routine CT abdomen/pelvis with and without IV contrast. Noncontrast, nephrographic and excretory phase coronal and sagittal reformats. Radiation dose reduction techniques were utilized, including automated exposure control and exposure modulation based on body size.  FINDINGS:  Lung bases: Small right pleural effusion. Subsegmental atelectasis at the right lung base.  ABDOMEN: Hepatic steatosis. Cholecystectomy. No biliary ductal dilatation. Spleen is normal in size. Mild pancreatic atrophy. No pancreatic ductal dilatation or mass seen. No adrenal nodules.  Nonobstructing nephrolithiasis in the inferior pole right kidney, series 2, axial mage 61, measures 2 to 3 mm.  Rim-enhancing fluid collection centered in the right psoas muscle, series 3, axial image 65 and series 5, coronal image 104, measures 7.9 x 6.3 x 13.2 cm, with surrounding edema and ill-defined fluid, extends from the right lower abdomen into the right upper abdomen, with anterior displacement of the right kidney, with extension into the kidney, series 3, axial mage 51 extending along the capsule of the superior pole and mid kidney, consistent with an abscess. Striated right nephrogram seen on the excretory phase.  Punctate nonobstructing nephrolithiasis in the  left kidney. For example, punctate nonobstructing nephrolithiasis in the left mid kidney, series 2, axial mage 53, measures 1 mm. No hydronephrosis.  Pelvis: Underdistended bladder. No bladder calculus. Anteverted uterus. No adnexal mass.  Bowel: No obstruction. Gas and fluid in the colon. Normal appendix.  Abdominal wall: Rectus diastases.  Retroperitoneum: See above. No lymphadenopathy.  Vasculature: Patent. No abdominal aortic aneurysm.  Osseous structures: No destructive osseous lesions. Severe spondylosis/degenerative disc disease seen at L5/S1. Lower lumbar facet degenerative arthropathy. Subcapsular      Impression:  1. Large rim-enhancing fluid collection centered in the right psoas, extends into and displaces the right kidney, consistent with an abscess. Consider drain. 2. Striated right nephrogram seen on excretory phase. Suspect acute pyelonephritis. Can be correlated with urinalysis. 3. Bilateral nonobstructing nephrolithiasis. 4. Hepatic steatosis. 5. Gas and fluid in the colonic lumen. Correlate for diarrhea.  This report was finalized on 6/9/2024 9:06 AM by Dr. Godfrey Hidalgo M.D on Workstation: [id number]      CT Abdomen Pelvis With Contrast    Result Date: 6/6/2024  Narrative: REPORT-ID:[id number] EXAM:  CT Abdomen And Pelvis W/ Contrast Injection HISTORY:   R renal abn on plain CT ; rad requests contrast TECHNIQUE:   Routine protocol CT abdomen pelvis. IV Contrast:  IV Optiray 320 100ml .  Oral Contrast:  without. Sagittal and coronal images were reconstructed. RADIATION DOSAGE (If Supplied By Facility):  CTDIvol = ( 25.0 ) mGy, DLP = ( 1119.3 ) mGycm Individualized dose optimization techniques were used for this CT. COMPARISON:   CT abdomen and pelvis without IV contrast earlier same day portable the. LIMITATIONS: None. ___________________________________ FINDINGS: LOWER CHEST:  Lung bases are clear. LIVER:  Unremarkable. GALLBLADDER/BILE DUCTS:  Gallbladder is surgically  absent. PANCREAS:  Unremarkable. SPLEEN:  Unremarkable. ADRENAL GLANDS:  Left adrenal 1.3 cm nodule. KIDNEYS / URETERS:  Right kidney displaced anteriorly by the retroperitoneal process. Posterior margin of the right kidney upper to interpolar indistinct with the adjacent complex collection, at least a very small component appears to be perirenal, contiguous with the psoas compartment component. Subtle heterogeneous decreased cortical enhancement of the adjacent right kidney in this region but otherwise the right kidney appears unremarkable. Left kidney unremarkable. No hydronephrosis. Small calculi in both kidneys. BOWEL / MESENTERY:  Unremarkable.   No bowel obstruction. APPENDIX:  Identified and normal.  No evidence of acute appendicitis. PERITONEUM: No free air.    No free fluid. VESSELS:  Abdominal aorta is normal caliber. RETROPERITONEUM:  Complex predominantly low attenuation collection or mass in the right psoas muscle from the upper pole kidney level to the level of the iliac crest, 7.8 x 5.6 cm axial by 11.7 cm cranial caudal with partial peripheral enhancement and septations. Mild adjacent stranding. No obvious extension into the spinal canal. REPRODUCTIVE ORGANS:  Unremarkable. BLADDER: Unremarkable. ABDOMINAL WALL:  Unremarkable. BONES:  No acute abnormality. Degenerative changes in the lumbar spine. OTHER: None. _________________________________    Impression: Complex collection right perirenal and retroperitoneal most consistent with perirenal abscess with retroperitoneal extension. Correlation with urinalysis is recommended. Cystic malignancy including cystic renal cell carcinoma, lymphoma not entirely excluded and follow-up is needed. Close proximity to the spinal canal without obvious intraspinal extension. No definitive CT findings to suggest discitis osteomyelitis as primary source. Consider MRI spine without and with IV contrast for complete assessment. Left adrenal nodule 1.3 cm indeterminate.  Consider CT adrenal protocol in 12 months. Electronically Signed: Addie Kirby MD 2024/06/06 at 21:16 CDT Reading Location ID and State: 4230 / [place] Tel [phone], Service support  [phone], Fax [phone]    CT Abdomen Pelvis Without Contrast    Result Date: 6/6/2024  Narrative: **We are attempting to reach an attending provider to discuss findings. An addendum with communication details will be sent when the communication is complete. ** REPORT-ID:[id number] EXAM:  CT Abdomen And Pelvis W/O Contrast Injection HISTORY:   Abdominal pain, acute, nonlocalized TECHNIQUE:   Routine protocol CT abdomen and pelvis. IV Contrast:  None..  Oral contrast: None. RADIATION DOSAGE (If Supplied By Facility):  CTDIvol = ( 11.1 ) mGy, DLP = ( 593.4 ) mGycm Individualized dose optimization techniques were used for this CT. COMPARISON:   None. LIMITATIONS: None. ____________________________________________ FINDINGS: LOWER CHEST: Included lung bases are clear. LIVER: Grossly unremarkable. GALLBLADDER AND BILIARY TREE: Gallbladder surgically absent. PANCREAS: Grossly unremarkable. SPLEEN: Grossly unremarkable. ADRENAL GLANDS: Nodular thickening of the adrenal glands, with approximately 1.3 cm nodule left adrenal. KIDNEYS AND URETERS: Right kidney with contour abnormality posteriorly with indistinct margins, suspected complex mass and adjacent hematoma, contiguous with the adjacent psoas muscle, and mild adjacent stranding. Overall size is approximately 6.5 x 3.6 cm axial. Right kidney is displaced anteriorly by the posterior pararenal process. There are a few small calculi in both kidneys.  No hydronephrosis. PERITONEUM: No free air.  No free fluid. BOWEL: No bowel obstruction. APPENDIX: Visualized and unremarkable.  No evidence of acute appendicitis. VESSELS: Abdominal aorta is normal caliber. RETROPERITONEUM: Heterogeneous enlargement of the right psoas muscle from the mid pole kidney to the iliac  crest with nodular thickening and adjacent stranding suspected mass/hematoma. REPRODUCTIVE ORGANS:  Grossly unremarkable URINARY BLADDER: Grossly unremarkable. ABDOMINAL WALL: Unremarkable. BONES: No acute abnormalities. Degenerative changes in the lumbar spine ______________________________________________    Impression: Constellation of findings suggest combination of right renal/retroperitoneal mass and/or hemorrhage. Suspected hemorrhage of incidental renal mass/renal cell carcinoma, but other etiologies including retroperitoneal primary mass with secondary involvement of the kidney not excluded. Further imaging evaluation CT with IV contrast recommended. Bilateral nephrolithiasis without hydronephrosis. Left adrenal nodule. Electronically Signed: Addie Kirby MD 2024/06/06 at 19:23 CDT Reading Location ID and State: [address]30 / [place] Tel [phone], Service support  [phone], Fax [phone]     MDM:    Procedures              SHARED VISIT ATTESTATION:    This visit was performed by both myself and an APC.  I performed the substantive portion of the medical decision making. The management plan was made or approved by me, and I take responsibility for patient management.           Marco Antonio Gan MD  16:11 EDT  07/05/24         Marco Antonio Gna MD  07/05/24 3245

## 2024-07-05 NOTE — Clinical Note
Level of Care: Critical Care [6]   Diagnosis: Perinephric abscess [010367]   Certification: I Certify That Inpatient Hospital Services Are Medically Necessary For Greater Than 2 Midnights

## 2024-07-06 VITALS
WEIGHT: 187.17 LBS | HEART RATE: 107 BPM | DIASTOLIC BLOOD PRESSURE: 65 MMHG | HEIGHT: 66 IN | TEMPERATURE: 99.9 F | OXYGEN SATURATION: 91 % | RESPIRATION RATE: 18 BRPM | BODY MASS INDEX: 30.08 KG/M2 | SYSTOLIC BLOOD PRESSURE: 140 MMHG

## 2024-07-06 PROBLEM — D72.829 LEUKOCYTOSIS: Status: ACTIVE | Noted: 2024-07-06

## 2024-07-06 PROBLEM — E87.1 HYPONATREMIA: Status: ACTIVE | Noted: 2024-07-06

## 2024-07-06 PROBLEM — E87.6 HYPOKALEMIA: Status: ACTIVE | Noted: 2024-07-06

## 2024-07-06 PROCEDURE — 25810000003 SODIUM CHLORIDE 0.9 % SOLUTION: Performed by: STUDENT IN AN ORGANIZED HEALTH CARE EDUCATION/TRAINING PROGRAM

## 2024-07-06 PROCEDURE — 25010000002 HEPARIN (PORCINE) PER 1000 UNITS: Performed by: STUDENT IN AN ORGANIZED HEALTH CARE EDUCATION/TRAINING PROGRAM

## 2024-07-06 RX ORDER — SODIUM CHLORIDE 9 MG/ML
40 INJECTION, SOLUTION INTRAVENOUS AS NEEDED
Status: DISCONTINUED | OUTPATIENT
Start: 2024-07-06 | End: 2024-07-06 | Stop reason: HOSPADM

## 2024-07-06 RX ORDER — MORPHINE SULFATE 2 MG/ML
2 INJECTION, SOLUTION INTRAMUSCULAR; INTRAVENOUS EVERY 4 HOURS PRN
Status: DISCONTINUED | OUTPATIENT
Start: 2024-07-06 | End: 2024-07-06 | Stop reason: HOSPADM

## 2024-07-06 RX ORDER — BISACODYL 5 MG/1
5 TABLET, DELAYED RELEASE ORAL DAILY PRN
Status: DISCONTINUED | OUTPATIENT
Start: 2024-07-06 | End: 2024-07-06 | Stop reason: HOSPADM

## 2024-07-06 RX ORDER — AMOXICILLIN 250 MG
2 CAPSULE ORAL 2 TIMES DAILY PRN
Status: DISCONTINUED | OUTPATIENT
Start: 2024-07-06 | End: 2024-07-06 | Stop reason: HOSPADM

## 2024-07-06 RX ORDER — ACETAMINOPHEN 500 MG
1000 TABLET ORAL EVERY 8 HOURS PRN
Status: DISCONTINUED | OUTPATIENT
Start: 2024-07-06 | End: 2024-07-06 | Stop reason: HOSPADM

## 2024-07-06 RX ORDER — NALOXONE HCL 0.4 MG/ML
0.4 VIAL (ML) INJECTION
Status: DISCONTINUED | OUTPATIENT
Start: 2024-07-06 | End: 2024-07-06 | Stop reason: HOSPADM

## 2024-07-06 RX ORDER — ONDANSETRON 2 MG/ML
4 INJECTION INTRAMUSCULAR; INTRAVENOUS EVERY 6 HOURS PRN
Status: DISCONTINUED | OUTPATIENT
Start: 2024-07-06 | End: 2024-07-06 | Stop reason: HOSPADM

## 2024-07-06 RX ORDER — POLYETHYLENE GLYCOL 3350 17 G/17G
17 POWDER, FOR SOLUTION ORAL DAILY PRN
Status: DISCONTINUED | OUTPATIENT
Start: 2024-07-06 | End: 2024-07-06 | Stop reason: HOSPADM

## 2024-07-06 RX ORDER — BISACODYL 10 MG
10 SUPPOSITORY, RECTAL RECTAL DAILY PRN
Status: DISCONTINUED | OUTPATIENT
Start: 2024-07-06 | End: 2024-07-06 | Stop reason: HOSPADM

## 2024-07-06 RX ORDER — SODIUM CHLORIDE 0.9 % (FLUSH) 0.9 %
10 SYRINGE (ML) INJECTION AS NEEDED
Status: DISCONTINUED | OUTPATIENT
Start: 2024-07-06 | End: 2024-07-06 | Stop reason: HOSPADM

## 2024-07-06 RX ORDER — ONDANSETRON 4 MG/1
4 TABLET, ORALLY DISINTEGRATING ORAL EVERY 6 HOURS PRN
Status: DISCONTINUED | OUTPATIENT
Start: 2024-07-06 | End: 2024-07-06 | Stop reason: HOSPADM

## 2024-07-06 RX ORDER — HEPARIN SODIUM 5000 [USP'U]/ML
5000 INJECTION, SOLUTION INTRAVENOUS; SUBCUTANEOUS EVERY 12 HOURS SCHEDULED
Status: DISCONTINUED | OUTPATIENT
Start: 2024-07-06 | End: 2024-07-06 | Stop reason: HOSPADM

## 2024-07-06 RX ORDER — SODIUM CHLORIDE 0.9 % (FLUSH) 0.9 %
10 SYRINGE (ML) INJECTION EVERY 12 HOURS SCHEDULED
Status: DISCONTINUED | OUTPATIENT
Start: 2024-07-06 | End: 2024-07-06 | Stop reason: HOSPADM

## 2024-07-06 RX ORDER — UREA 10 %
10 LOTION (ML) TOPICAL NIGHTLY PRN
Status: DISCONTINUED | OUTPATIENT
Start: 2024-07-06 | End: 2024-07-06 | Stop reason: HOSPADM

## 2024-07-06 RX ORDER — SODIUM CHLORIDE 9 MG/ML
100 INJECTION, SOLUTION INTRAVENOUS CONTINUOUS
Status: DISCONTINUED | OUTPATIENT
Start: 2024-07-06 | End: 2024-07-06 | Stop reason: HOSPADM

## 2024-07-06 RX ORDER — NITROGLYCERIN 0.4 MG/1
0.4 TABLET SUBLINGUAL
Status: DISCONTINUED | OUTPATIENT
Start: 2024-07-06 | End: 2024-07-06 | Stop reason: HOSPADM

## 2024-07-06 RX ADMIN — SODIUM CHLORIDE 100 ML/HR: 9 INJECTION, SOLUTION INTRAVENOUS at 03:23

## 2024-07-06 RX ADMIN — HEPARIN SODIUM 5000 UNITS: 5000 INJECTION INTRAVENOUS; SUBCUTANEOUS at 09:10

## 2024-07-06 RX ADMIN — Medication 10 ML: at 09:10

## 2024-07-06 RX ADMIN — ACETAMINOPHEN 1000 MG: 500 TABLET ORAL at 11:28

## 2024-07-06 NOTE — NURSING NOTE
Attempt to call report to [facility] at 1020 - room wasn't clean, won't accept report. Advised to call back.

## 2024-07-06 NOTE — DISCHARGE SUMMARY
[facility]         HOSPITALIST  DISCHARGE SUMMARY    Patient Name: Nia SCALES  : 1970  MRN: [id number]    Date of Admission: 2024  Date of Discharge: 2024  Primary Care Physician: Provider, No Known    Consults       Date and Time Order Name Status Description    2024 11:10 PM Inpatient Hospitalist Consult      2024  4:19 PM Inpatient General Surgery Consult      2024 10:53 PM Inpatient General Surgery Consult Completed     2024 12:52 PM Inpatient Infectious Diseases Consult Completed     2024 12:52 PM Inpatient Urology Consult Completed             Active and Resolved Hospital Problems:  Active Hospital Problems    Diagnosis POA    **Perinephric abscess [N15.1] Yes    Leukocytosis [D72.829] Unknown    Hypokalemia [E87.6] Unknown    Hyponatremia [E87.1] Unknown    Sepsis [A41.9] Yes      Resolved Hospital Problems   No resolved problems to display.       Hospital Course     Hospital Course:  Nia SCALES is a 54 y.o. female  presents with right-sided flank pain.  This has been going on for 2 weeks and has been worsening.  She has had fevers, nausea and diarrhea.  She has a history of CCY.  Patient has been soiling herself.  Her  cleans her up normally.  She was last seen in [facility] 2024 where she was found of a perinephric abscess, UTI, sepsis.  She was transferred to [facility] on 2024.  She was recommended to have an IR drain placed in the abscess and was given Rocephin however she left AMA on 2024.  She continued to worsen to the point of coming back to the hospital here at [facility].  On arrival patient was found to be febrile to 100.9.  Tachycardic 100s to 1 teens.  Blood pressure within normal limits.  Satting well on room air.  Labs significant for potassium of 2.8 glucose elevated at 301 white blood cell count elevated to 21.7.  Platelets elevated to 640.  Urinalysis with turbid with trace ketones moderate blood  positive nitrite leukocytes protein with too numerous to count red blood cells and white blood cells 4+ bacteria.  Urine culture blood cultures obtained.  Patient started on vancomycin and Rocephin empirically and bolused 30 cc/kg normal saline.  CT abdomen pelvis demonstrated enlarging right retroperitoneal abscess centered in the psoas muscle with extension into the right paraspinal musculature.  Emergency department provider discussed this patient with Dr. Powell, General Surgery from [facility]. He recommends admitting to the hospitalist team. Okay to consult if needed.  Emergency department provider discussed the case with Dr. Kirkpatrick, Hospitalist, at [facility] who agrees to accept the patient as a transfer.  Unfortunately no beds immediately available.  Patient was admitted overnight awaiting bed availability.  Continued on antibiotics with as needed antipyretics and analgesics as well as IV fluids.  Bed became available at [facility] on 7/6/2024.  Patient stable for transfer to [facility] for further evaluation and treatment.  Vitals at time of discharge temperature 99.9, pulse 107, respiratory rate 18, blood pressure 140/76, satting 91% on room air.  Culture results still pending on discharge.    DISCHARGE Follow Up Recommendations for labs and diagnostics: As above        Discharge Details        Discharge Medications        New Medications        Instructions Start Date   cefTRIAXone 2,000 mg in sodium chloride 0.9 % 100 mL IVPB   2,000 mg, Intravenous, Every 24 Hours             Continue These Medications        Instructions Start Date   acetaminophen 325 MG tablet  Commonly known as: TYLENOL   650 mg, Oral, Every 4 Hours PRN               No Known Allergies    Discharge Disposition:  Short Term Hospital (DC - External)    Diet:  Hospital:  Diet Order   Procedures    Diet: Regular/House; Fluid Consistency: Thin (IDDSI 0)       Discharge Activity:       CODE STATUS:  Code  Status and Medical Interventions:   Ordered at: 07/05/24 2330     Code Status (Patient has no pulse and is not breathing):    CPR (Attempt to Resuscitate)     Medical Interventions (Patient has pulse or is breathing):    Full Support         No future appointments.        Pertinent  and/or Most Recent Results     PROCEDURES:   None    LAB RESULTS:      Lab 07/05/24  1522   WBC 21.73*   HEMOGLOBIN 10.2*   HEMATOCRIT 32.5*   PLATELETS 640*   NEUTROS ABS 19.63*   IMMATURE GRANS (ABS) 0.20*   LYMPHS ABS 1.18   MONOS ABS 0.61   EOS ABS 0.07   MCV 84.0   LACTATE 1.9         Lab 07/05/24  1522   SODIUM 132*   POTASSIUM 2.8*   CHLORIDE 91*   CO2 26.3   ANION GAP 14.7   BUN 10   CREATININE 0.68   EGFR 102.4   GLUCOSE 301*   CALCIUM 8.7   MAGNESIUM 1.7         Lab 07/05/24  1522   TOTAL PROTEIN 7.4   ALBUMIN 2.9*   GLOBULIN 4.5   ALT (SGPT) 25   AST (SGOT) 21   BILIRUBIN 0.4   ALK PHOS 193*                     Brief Urine Lab Results  (Last result in the past 365 days)        Color   Clarity   Blood   Leuk Est   Nitrite   Protein   CREAT   Urine HCG        07/05/24 1444 Dark Yellow   Turbid   Moderate (2+)   Large (3+)   Positive   100 mg/dL (2+)                 Microbiology Results (last 10 days)       ** No results found for the last 240 hours. **            XR Chest 1 View    Result Date: 7/5/2024  Impression: Impression: No focal airspace consolidation. Electronically Signed: Ralf Gallagher MD  7/5/2024 4:31 PM EDT  Workstation ID: [id number]    CT Abdomen Pelvis With Contrast    Result Date: 7/5/2024  Impression: Impression: 1.Enlarging right retroperitoneal abscess centered in the psoas muscle. There is now extension into the right paraspinal musculature. 2.Other stable chronic findings. Electronically Signed: Oscar Callejas MD  7/5/2024 4:04 PM EDT  Workstation ID: [id number]    CT Abdomen Pelvis With & Without Contrast    Result Date: 6/9/2024  Impression:  1. Large rim-enhancing fluid collection centered in the right psoas,  extends into and displaces the right kidney, consistent with an abscess. Consider drain. 2. Striated right nephrogram seen on excretory phase. Suspect acute pyelonephritis. Can be correlated with urinalysis. 3. Bilateral nonobstructing nephrolithiasis. 4. Hepatic steatosis. 5. Gas and fluid in the colonic lumen. Correlate for diarrhea.  This report was finalized on 6/9/2024 9:06 AM by Dr. Godfrey Hidalgo M.D on Workstation: [id number]      CT Abdomen Pelvis With Contrast    Result Date: 6/6/2024  Impression: Complex collection right perirenal and retroperitoneal most consistent with perirenal abscess with retroperitoneal extension. Correlation with urinalysis is recommended. Cystic malignancy including cystic renal cell carcinoma, lymphoma not entirely excluded and follow-up is needed. Close proximity to the spinal canal without obvious intraspinal extension. No definitive CT findings to suggest discitis osteomyelitis as primary source. Consider MRI spine without and with IV contrast for complete assessment. Left adrenal nodule 1.3 cm indeterminate. Consider CT adrenal protocol in 12 months. Electronically Signed: Addie Kirby MD 2024/06/06 at 21:16 CDT Reading Location ID and State: [email or web address] / [place] Tel [phone], Service support  [phone], Fax [phone]    CT Abdomen Pelvis Without Contrast    Result Date: 6/6/2024  Impression: Constellation of findings suggest combination of right renal/retroperitoneal mass and/or hemorrhage. Suspected hemorrhage of incidental renal mass/renal cell carcinoma, but other etiologies including retroperitoneal primary mass with secondary involvement of the kidney not excluded. Further imaging evaluation CT with IV contrast recommended. Bilateral nephrolithiasis without hydronephrosis. Left adrenal nodule. Electronically Signed: Addie Kirby MD 2024/06/06 at 19:23 CDT Reading Location ID and State: [address] / [place] Tel [phone], Service support  [phone], Fax  [phone]                 Labs Pending at Discharge:  Pending Labs       Order Current Status    Blood Culture - Blood, Arm, Left In process    Blood Culture - Blood, Arm, Left In process    Urine Culture - Urine, Urine, Clean Catch In process              Electronically signed by Ralf Collins MD, 07/06/24, 11:26 AM EDT.

## 2024-07-06 NOTE — PLAN OF CARE
Goal Outcome Evaluation:        Problem: Adult Inpatient Plan of Care  Goal: Plan of Care Review  Outcome: Ongoing, Progressing         Pt admitted from ED with sepsis, pt to be transferred to [facility] for abscess drainage when bed is available

## 2024-07-06 NOTE — ED NOTES
Per Crystal at [facility], patient will most likely not have a bed until tomorrow, July 6, 2024., Provider at [facility] notified

## 2024-07-06 NOTE — H&P
Patient Care Team:  Provider, No Known as PCP - General    Chief complaint right-sided flank pain    Subjective     Patient is a 54 y.o. female presents with right-sided flank pain.  This has been going on for 2 weeks and has been worsening.  She has had fevers, nausea and diarrhea.  She has a history of CCY.  Patient has been soiling herself.  Her  cleans her up normally.  She was last seen in [facility] 6/6/2024 where she was found of a perinephric abscess, UTI, sepsis.  She was transferred to [facility] on 6/7/2024.  She was recommended to have an IR drain placed in the abscess and was given Rocephin however she left AMA on 6/9/2024.  She continued to worsen to the point of coming back to the hospital here.    Review of Systems   Pertinent items are noted in HPI    History  History reviewed. No pertinent past medical history.  Past Surgical History:   Procedure Laterality Date    CHOLECYSTECTOMY       Family History   Problem Relation Age of Onset    Cancer Mother     Alcohol abuse Mother      Social History     Tobacco Use    Smoking status: Every Day     Current packs/day: 1.00     Average packs/day: 1 pack/day for 42.5 years (42.5 ttl pk-yrs)     Types: Cigarettes     Start date: 1982    Smokeless tobacco: Never   Vaping Use    Vaping status: Never Used   Substance Use Topics    Alcohol use: Never    Drug use: Never     Medications Prior to Admission   Medication Sig Dispense Refill Last Dose    acetaminophen (TYLENOL) 325 MG tablet Take 2 tablets by mouth Every 4 (Four) Hours As Needed for Mild Pain.        Allergies:  Patient has no known allergies.    Objective     Vital Signs  Temp:  [98.4 °F (36.9 °C)-100.9 °F (38.3 °C)] 98.8 °F (37.1 °C)  Heart Rate:  [] 114  Resp:  [18-23] 18  BP: (125-146)/(65-68) 146/68    Physical Exam:      General Appearance:  Alert, cooperative, in no acute distress   Head:  Normocephalic, without obvious abnormality, atraumatic   Eyes:  Lids and  lashes normal, conjunctivae and sclerae normal, no icterus, no pallor, corneas clear, PERRLA   Ears:  Ears appear intact with no abnormalities noted   Throat:  No oral lesions, no thrush, oral mucosa moist   Neck:  No adenopathy, supple, trachea midline, no thyromegaly, no carotid bruit, no JVD   Back:  No kyphosis present, no scoliosis present, no skin lesions, erythema or scars, no tenderness to percussion, or palpation, range of motion normal   Lungs:  Clear to auscultation,respirations regular, even and unlabored    Heart:  Regular rhythm and normal rate, normal S1 and S2, no murmur, no gallop, no rub, no click   Breast Exam:  Deferred   Abdomen:  Normal bowel sounds, no masses, no organomegaly, soft non-tender, non-distended, no guarding, no rebound tenderness   Genitalia:  Deferred   Extremities:  Moves all extremities well, no edema, no cyanosis, no redness   Pulses:  Pulses palpable and equal bilaterally   Skin:  No bleeding, bruising or rash   Lymph nodes:  No palpable adenopathy   Neurologic:  Cranial nerves 2 - 12 grossly intact, sensation intact, DTR present and equal bilaterally       Results Review:    I reviewed the patient's new clinical results.  I reviewed the patient's new imaging results and agree with the interpretation.  I reviewed the patient's other test results and agree with the interpretation  I personally viewed and interpreted the patient's EKG/Telemetry data    Assessment & Plan       Perinephric abscess    Sepsis    Leukocytosis    Hypokalemia      Admit to telemetry floor  IV antibiotics  IV fluids  Supportive care  Pending bed placement at [facility] where she has been accepted.  Closely monitor electrolytes  Full code        Paul Walls MD  07/06/24  05:05 EDT

## 2024-07-07 LAB — BACTERIA SPEC AEROBE CULT: ABNORMAL

## 2024-07-08 LAB
QT INTERVAL: 366 MS
QTC INTERVAL: 481 MS

## 2024-07-10 LAB
BACTERIA SPEC AEROBE CULT: NORMAL
BACTERIA SPEC AEROBE CULT: NORMAL

## 2024-09-09 ENCOUNTER — APPOINTMENT (OUTPATIENT)
Dept: GENERAL RADIOLOGY | Facility: HOSPITAL | Age: 54
End: 2024-09-09
Payer: COMMERCIAL

## 2024-09-09 ENCOUNTER — APPOINTMENT (OUTPATIENT)
Dept: CT IMAGING | Facility: HOSPITAL | Age: 54
End: 2024-09-09
Payer: COMMERCIAL

## 2024-09-09 ENCOUNTER — HOSPITAL ENCOUNTER (EMERGENCY)
Facility: HOSPITAL | Age: 54
Discharge: HOME | End: 2024-09-09
Attending: EMERGENCY MEDICINE | Admitting: EMERGENCY MEDICINE
Payer: COMMERCIAL

## 2024-09-09 VITALS
RESPIRATION RATE: 18 BRPM | DIASTOLIC BLOOD PRESSURE: 74 MMHG | HEART RATE: 101 BPM | BODY MASS INDEX: 27.46 KG/M2 | OXYGEN SATURATION: 98 % | SYSTOLIC BLOOD PRESSURE: 94 MMHG | WEIGHT: 170.86 LBS | HEIGHT: 66 IN | TEMPERATURE: 98.1 F

## 2024-09-09 DIAGNOSIS — N39.0 ACUTE UTI: Primary | ICD-10-CM

## 2024-09-09 DIAGNOSIS — B37.31 YEAST VAGINITIS: ICD-10-CM

## 2024-09-09 LAB
ALBUMIN SERPL-MCNC: 3.5 G/DL (ref 3.5–5.2)
ALBUMIN/GLOB SERPL: 0.9 G/DL
ALP SERPL-CCNC: 76 U/L (ref 39–117)
ALT SERPL W P-5'-P-CCNC: 8 U/L (ref 1–33)
ANION GAP SERPL CALCULATED.3IONS-SCNC: 11.6 MMOL/L (ref 5–15)
AST SERPL-CCNC: 9 U/L (ref 1–32)
BACTERIA UR QL AUTO: ABNORMAL /HPF
BASOPHILS # BLD AUTO: 0.11 10*3/MM3 (ref 0–0.2)
BASOPHILS NFR BLD AUTO: 1 % (ref 0–1.5)
BILIRUB SERPL-MCNC: 0.2 MG/DL (ref 0–1.2)
BILIRUB UR QL STRIP: NEGATIVE
BUN SERPL-MCNC: 14 MG/DL (ref 6–20)
BUN/CREAT SERPL: 23.7 (ref 7–25)
CALCIUM SPEC-SCNC: 9.7 MG/DL (ref 8.6–10.5)
CHLORIDE SERPL-SCNC: 101 MMOL/L (ref 98–107)
CLARITY UR: ABNORMAL
CO2 SERPL-SCNC: 22.4 MMOL/L (ref 22–29)
COLOR UR: YELLOW
CREAT SERPL-MCNC: 0.59 MG/DL (ref 0.57–1)
D-LACTATE SERPL-SCNC: 1.6 MMOL/L (ref 0.5–2)
DEPRECATED RDW RBC AUTO: 45.1 FL (ref 37–54)
EGFRCR SERPLBLD CKD-EPI 2021: 105.9 ML/MIN/1.73
EOSINOPHIL # BLD AUTO: 0.37 10*3/MM3 (ref 0–0.4)
EOSINOPHIL NFR BLD AUTO: 3.4 % (ref 0.3–6.2)
ERYTHROCYTE [DISTWIDTH] IN BLOOD BY AUTOMATED COUNT: 14.3 % (ref 12.3–15.4)
GLOBULIN UR ELPH-MCNC: 4.1 GM/DL
GLUCOSE SERPL-MCNC: 229 MG/DL (ref 65–99)
GLUCOSE UR STRIP-MCNC: NEGATIVE MG/DL
HCT VFR BLD AUTO: 38.5 % (ref 34–46.6)
HGB BLD-MCNC: 12.3 G/DL (ref 12–15.9)
HGB UR QL STRIP.AUTO: ABNORMAL
HOLD SPECIMEN: NORMAL
HOLD SPECIMEN: NORMAL
HYALINE CASTS UR QL AUTO: ABNORMAL /LPF
IMM GRANULOCYTES # BLD AUTO: 0.03 10*3/MM3 (ref 0–0.05)
IMM GRANULOCYTES NFR BLD AUTO: 0.3 % (ref 0–0.5)
KETONES UR QL STRIP: NEGATIVE
LEUKOCYTE ESTERASE UR QL STRIP.AUTO: ABNORMAL
LYMPHOCYTES # BLD AUTO: 2.48 10*3/MM3 (ref 0.7–3.1)
LYMPHOCYTES NFR BLD AUTO: 23.1 % (ref 19.6–45.3)
MCH RBC QN AUTO: 27.5 PG (ref 26.6–33)
MCHC RBC AUTO-ENTMCNC: 31.9 G/DL (ref 31.5–35.7)
MCV RBC AUTO: 86.1 FL (ref 79–97)
MONOCYTES # BLD AUTO: 0.71 10*3/MM3 (ref 0.1–0.9)
MONOCYTES NFR BLD AUTO: 6.6 % (ref 5–12)
NEUTROPHILS NFR BLD AUTO: 65.6 % (ref 42.7–76)
NEUTROPHILS NFR BLD AUTO: 7.04 10*3/MM3 (ref 1.7–7)
NITRITE UR QL STRIP: NEGATIVE
NRBC BLD AUTO-RTO: 0 /100 WBC (ref 0–0.2)
PH UR STRIP.AUTO: 6 [PH] (ref 5–8)
PLATELET # BLD AUTO: 400 10*3/MM3 (ref 140–450)
PMV BLD AUTO: 9.9 FL (ref 6–12)
POTASSIUM SERPL-SCNC: 4.1 MMOL/L (ref 3.5–5.2)
PROT SERPL-MCNC: 7.6 G/DL (ref 6–8.5)
PROT UR QL STRIP: ABNORMAL
RBC # BLD AUTO: 4.47 10*6/MM3 (ref 3.77–5.28)
RBC # UR STRIP: ABNORMAL /HPF
REF LAB TEST METHOD: ABNORMAL
SODIUM SERPL-SCNC: 135 MMOL/L (ref 136–145)
SP GR UR STRIP: 1.03 (ref 1–1.03)
SQUAMOUS #/AREA URNS HPF: ABNORMAL /HPF
UROBILINOGEN UR QL STRIP: ABNORMAL
WBC # UR STRIP: ABNORMAL /HPF
WBC NRBC COR # BLD AUTO: 10.74 10*3/MM3 (ref 3.4–10.8)
WHOLE BLOOD HOLD COAG: NORMAL
WHOLE BLOOD HOLD SPECIMEN: NORMAL
YEAST URNS QL MICRO: ABNORMAL /HPF

## 2024-09-09 PROCEDURE — 96365 THER/PROPH/DIAG IV INF INIT: CPT

## 2024-09-09 PROCEDURE — 36415 COLL VENOUS BLD VENIPUNCTURE: CPT

## 2024-09-09 PROCEDURE — 87147 CULTURE TYPE IMMUNOLOGIC: CPT | Performed by: EMERGENCY MEDICINE

## 2024-09-09 PROCEDURE — 81001 URINALYSIS AUTO W/SCOPE: CPT | Performed by: EMERGENCY MEDICINE

## 2024-09-09 PROCEDURE — 80053 COMPREHEN METABOLIC PANEL: CPT | Performed by: EMERGENCY MEDICINE

## 2024-09-09 PROCEDURE — 25010000002 CEFTRIAXONE PER 250 MG: Performed by: EMERGENCY MEDICINE

## 2024-09-09 PROCEDURE — 25510000001 IOPAMIDOL PER 1 ML: Performed by: EMERGENCY MEDICINE

## 2024-09-09 PROCEDURE — 83605 ASSAY OF LACTIC ACID: CPT | Performed by: EMERGENCY MEDICINE

## 2024-09-09 PROCEDURE — 87040 BLOOD CULTURE FOR BACTERIA: CPT | Performed by: EMERGENCY MEDICINE

## 2024-09-09 PROCEDURE — 99285 EMERGENCY DEPT VISIT HI MDM: CPT

## 2024-09-09 PROCEDURE — 87186 SC STD MICRODIL/AGAR DIL: CPT | Performed by: EMERGENCY MEDICINE

## 2024-09-09 PROCEDURE — 87086 URINE CULTURE/COLONY COUNT: CPT | Performed by: EMERGENCY MEDICINE

## 2024-09-09 PROCEDURE — 74177 CT ABD & PELVIS W/CONTRAST: CPT

## 2024-09-09 PROCEDURE — 72100 X-RAY EXAM L-S SPINE 2/3 VWS: CPT

## 2024-09-09 PROCEDURE — 85025 COMPLETE CBC W/AUTO DIFF WBC: CPT | Performed by: EMERGENCY MEDICINE

## 2024-09-09 RX ORDER — FLUCONAZOLE 150 MG/1
150 TABLET ORAL ONCE
Status: COMPLETED | OUTPATIENT
Start: 2024-09-09 | End: 2024-09-09

## 2024-09-09 RX ORDER — FLUCONAZOLE 150 MG/1
150 TABLET ORAL ONCE
Qty: 1 TABLET | Refills: 0 | Status: SHIPPED | OUTPATIENT
Start: 2024-09-09 | End: 2024-09-09

## 2024-09-09 RX ORDER — IOPAMIDOL 755 MG/ML
100 INJECTION, SOLUTION INTRAVASCULAR
Status: COMPLETED | OUTPATIENT
Start: 2024-09-09 | End: 2024-09-09

## 2024-09-09 RX ORDER — CEPHALEXIN 500 MG/1
500 CAPSULE ORAL 3 TIMES DAILY
Qty: 21 CAPSULE | Refills: 0 | Status: SHIPPED | OUTPATIENT
Start: 2024-09-09 | End: 2024-09-16

## 2024-09-09 RX ADMIN — CEFTRIAXONE SODIUM 1000 MG: 1 INJECTION, POWDER, FOR SOLUTION INTRAMUSCULAR; INTRAVENOUS at 19:03

## 2024-09-09 RX ADMIN — IOPAMIDOL 100 ML: 755 INJECTION, SOLUTION INTRAVENOUS at 16:44

## 2024-09-09 RX ADMIN — FLUCONAZOLE 150 MG: 150 TABLET ORAL at 19:03

## 2024-09-09 NOTE — ED PROVIDER NOTES
Time: 12:55 PM EDT  Date of encounter:  9/9/2024  Independent Historian/Clinical History and Information was obtained by:   Patient    History is limited by:  Patient is a poor historian    Chief Complaint   Patient presents with    Post-op Problem         History of Present Illness:  Patient is a 54 y.o. year old female who presents to the emergency department for evaluation of chills and low grade fevers.  Patient has a drain placed to lower lumbar area for a recent kidney procedure that she feels may have been dislodged and become infected.  (TRI Boyle, APRN)  On further review patient had surgery at [facility] in July for intra-abdominal abscess.  She states she left there AGAINST MEDICAL ADVICE with no medications.  In the last few days she now complains of subjective fevers and chills, tremors and feels sick.      Patient Care Team  Primary Care Provider: Provider, No Known    Past Medical History:     Allergies   Allergen Reactions    Benadryl [Diphenhydramine] Hives     History reviewed. No pertinent past medical history.  Past Surgical History:   Procedure Laterality Date    CHOLECYSTECTOMY       Family History   Problem Relation Age of Onset    Cancer Mother     Alcohol abuse Mother        Home Medications:  Prior to Admission medications    Medication Sig Start Date End Date Taking? Authorizing Provider   acetaminophen (TYLENOL) 325 MG tablet Take 2 tablets by mouth Every 4 (Four) Hours As Needed for Mild Pain.    Provider, Historical, MD        Social History:   Social History     Tobacco Use    Smoking status: Every Day     Current packs/day: 1.00     Average packs/day: 1 pack/day for 42.7 years (42.7 ttl pk-yrs)     Types: Cigarettes     Start date: 1982    Smokeless tobacco: Never   Vaping Use    Vaping status: Never Used   Substance Use Topics    Alcohol use: Never    Drug use: Never         Review of Systems:  Review of Systems   Constitutional:  Positive for  "chills and fever.   HENT:  Negative for congestion, rhinorrhea and sore throat.    Eyes:  Negative for photophobia.   Respiratory:  Negative for apnea, cough, chest tightness and shortness of breath.    Cardiovascular:  Negative for chest pain and palpitations.   Gastrointestinal:  Positive for abdominal pain. Negative for diarrhea, nausea and vomiting.   Endocrine: Negative.    Genitourinary:  Negative for difficulty urinating and dysuria.   Musculoskeletal:  Positive for back pain. Negative for joint swelling and myalgias.   Skin:  Negative for color change and wound.   Allergic/Immunologic: Negative.    Neurological:  Negative for seizures and headaches.   Psychiatric/Behavioral: Negative.     All other systems reviewed and are negative.       Physical Exam:  BP 94/74   Pulse 101   Temp 98.1 °F (36.7 °C)   Resp 18   Ht 167.6 cm (66\")   Wt 77.5 kg (170 lb 13.7 oz)   LMP  (LMP Unknown)   SpO2 98%   BMI 27.58 kg/m²         Physical Exam  Vitals and nursing note reviewed.   Constitutional:       General: She is awake.      Appearance: Normal appearance.   HENT:      Head: Normocephalic and atraumatic.      Nose: Nose normal.      Mouth/Throat:      Mouth: Mucous membranes are moist.   Eyes:      Extraocular Movements: Extraocular movements intact.      Pupils: Pupils are equal, round, and reactive to light.   Cardiovascular:      Rate and Rhythm: Normal rate and regular rhythm.      Heart sounds: Normal heart sounds.   Pulmonary:      Effort: Pulmonary effort is normal. No respiratory distress.      Breath sounds: Normal breath sounds. No wheezing, rhonchi or rales.   Abdominal:      General: Bowel sounds are normal.      Palpations: Abdomen is soft.      Tenderness: There is no abdominal tenderness. There is no guarding or rebound.      Comments: No rigidity   Musculoskeletal:         General: No tenderness. Normal range of motion.      Cervical back: Normal range of motion and neck supple.   Skin:     " General: Skin is warm and dry.      Coloration: Skin is not jaundiced.      Comments: Patient has 2 drains in her right back, upper and lower.  There is a malodorous smell of infection when she rolls over.  No obvious cellulitis or abscess.   Neurological:      General: No focal deficit present.      Mental Status: She is alert. Mental status is at baseline.   Psychiatric:         Mood and Affect: Mood normal.                      Procedures:  Procedures      Medical Decision Making:      Comorbidities that affect care:    Right sided retroperitoneal abscess    External Notes reviewed:    Hospital Discharge Summary:    Patient Name: Nia SCALES  : 1970  MRN: [id number]     Date of Admission: 2024  Date of Discharge: 2024  Primary Care Physician: Provider, No Known     Consults         Date and Time Order Name Status Description     2024 11:10 PM Inpatient Hospitalist Consult         2024  4:19 PM Inpatient General Surgery Consult         2024 10:53 PM Inpatient General Surgery Consult Completed       2024 12:52 PM Inpatient Infectious Diseases Consult Completed       2024 12:52 PM Inpatient Urology Consult Completed                  Active and Resolved Hospital Problems:       Active Hospital Problems     Diagnosis POA    **Perinephric abscess [N15.1] Yes    Leukocytosis [D72.829] Unknown    Hypokalemia [E87.6] Unknown    Hyponatremia [E87.1] Unknown    Sepsis [A41.9] Yes       Resolved Hospital Problems   No resolved problems to display.         Hospital Course      Hospital Course:  Nia SCALES is a 54 y.o. female  presents with right-sided flank pain.  This has been going on for 2 weeks and has been worsening.  She has had fevers, nausea and diarrhea.  She has a history of CCY.  Patient has been soiling herself.  Her  cleans her up normally.  She was last seen in [facility] 2024 where she was found of a perinephric abscess, UTI, sepsis.  She was  transferred to [facility] on 6/7/2024.  She was recommended to have an IR drain placed in the abscess and was given Rocephin however she left AMA on 6/9/2024.  She continued to worsen to the point of coming back to the hospital here at [facility].  On arrival patient was found to be febrile to 100.9.  Tachycardic 100s to 1 teens.  Blood pressure within normal limits.  Satting well on room air.  Labs significant for potassium of 2.8 glucose elevated at 301 white blood cell count elevated to 21.7.  Platelets elevated to 640.  Urinalysis with turbid with trace ketones moderate blood positive nitrite leukocytes protein with too numerous to count red blood cells and white blood cells 4+ bacteria.  Urine culture blood cultures obtained.  Patient started on vancomycin and Rocephin empirically and bolused 30 cc/kg normal saline.  CT abdomen pelvis demonstrated enlarging right retroperitoneal abscess centered in the psoas muscle with extension into the right paraspinal musculature.  Emergency department provider discussed this patient with Dr. Powell, General Surgery from [facility]. He recommends admitting to the hospitalist team. Okay to consult if needed.  Emergency department provider discussed the case with Dr. Kirkpatrick, Hospitalist, at [facility] who agrees to accept the patient as a transfer.  Unfortunately no beds immediately available.  Patient was admitted overnight awaiting bed availability.  Continued on antibiotics with as needed antipyretics and analgesics as well as IV fluids.  Bed became available at [facility] on 7/6/2024.  Patient stable for transfer to [facility] for further evaluation and treatment.  Vitals at time of discharge temperature 99.9, pulse 107, respiratory rate 18, blood pressure 140/76, satting 91% on room air.  Culture results still pending on discharge.     DISCHARGE Follow Up Recommendations for labs and diagnostics: As above       The following orders were  placed and all results were independently analyzed by me:  Orders Placed This Encounter   Procedures    Blood Culture - Blood,    Blood Culture - Blood,    Urine Culture - Urine,    XR Spine Lumbar 2 or 3 View    CT Abdomen Pelvis With Contrast    Comprehensive Metabolic Panel    [place] Draw    Lactic Acid, Plasma    CBC Auto Differential    Urinalysis With Culture If Indicated - Urine, Clean Catch    Urinalysis, Microscopic Only - Urine, Clean Catch    CBC & Differential    Green Top (Gel)    Lavender Top    Gold Top - SST    Light Blue Top       Medications Given in the Emergency Department:  Medications   iopamidol (ISOVUE-370) 76 % injection 100 mL (100 mL Intravenous Given 9/9/24 1644)   fluconazole (DIFLUCAN) tablet 150 mg (150 mg Oral Given 9/9/24 1903)   cefTRIAXone (ROCEPHIN) 1,000 mg in sodium chloride 0.9 % 100 mL IVPB-VTB (0 mg Intravenous Stopped 9/9/24 1950)        ED Course:    The patient was initially evaluated in the triage area where orders were placed. The patient was later dispositioned by Jurgen Mauro MD.      The patient was advised to stay for completion of workup which includes but is not limited to communication of labs and radiological results, reassessment and plan. The patient was advised that leaving prior to disposition by a provider could result in critical findings that are not communicated to the patient.     ED Course as of 09/09/24 2356   Mon Sep 09, 2024   1256 --- RED ROUNDING PROVIDER ---  Patient was rounded on by TRI gold APRN. Orders were written and the patient is currently awaiting disposition.   [MS]   1258 Pt is asking for something to eat, drink, and to go outside and smoke.  [MS]   1259 [facility]  [MS]   1303 Patient is a poor historian and cannot recall exact procedure she had completed, cannot recall the exact date, and has had no follow-up since surgery. [MS]   1839 Case discussed with general surgery on-call at  [facility], .  He states based on CT scan and lab work, vital signs there is no indication for emergent transfer.  He does recommend the patient calling tomorrow to get general surgery follow-up and also strongly recommend that the patient has any worsening of illness she presents emergently to their emergency department. [RP]      ED Course User Index  [MS] Estephania Boyle APRN  [RP] Jurgen Mauro MD       Labs:    Lab Results (last 24 hours)       Procedure Component Value Units Date/Time    CBC & Differential [[id number]]  (Abnormal) Collected: 09/09/24 1136    Specimen: Blood Updated: 09/09/24 1145    Narrative:      The following orders were created for panel order CBC & Differential.  Procedure                               Abnormality         Status                     ---------                               -----------         ------                     CBC Auto Differential[[id number]]        Abnormal            Final result                 Please view results for these tests on the individual orders.    Comprehensive Metabolic Panel [[id number]]  (Abnormal) Collected: 09/09/24 1136    Specimen: Blood Updated: 09/09/24 1202     Glucose 229 mg/dL      BUN 14 mg/dL      Creatinine 0.59 mg/dL      Sodium 135 mmol/L      Potassium 4.1 mmol/L      Chloride 101 mmol/L      CO2 22.4 mmol/L      Calcium 9.7 mg/dL      Total Protein 7.6 g/dL      Albumin 3.5 g/dL      ALT (SGPT) 8 U/L      AST (SGOT) 9 U/L      Alkaline Phosphatase 76 U/L      Total Bilirubin 0.2 mg/dL      Globulin 4.1 gm/dL      A/G Ratio 0.9 g/dL      BUN/Creatinine Ratio 23.7     Anion Gap 11.6 mmol/L      eGFR 105.9 mL/min/1.73     Narrative:      GFR Normal >60  Chronic Kidney Disease <60  Kidney Failure <15      Lactic Acid, Plasma [[id number]]  (Normal) Collected: 09/09/24 1136    Specimen: Blood Updated: 09/09/24 1158     Lactate 1.6 mmol/L     CBC Auto Differential [[id number]]  (Abnormal) Collected: 09/09/24  1136    Specimen: Blood Updated: 09/09/24 1145     WBC 10.74 10*3/mm3      RBC 4.47 10*6/mm3      Hemoglobin 12.3 g/dL      Hematocrit 38.5 %      MCV 86.1 fL      MCH 27.5 pg      MCHC 31.9 g/dL      RDW 14.3 %      RDW-SD 45.1 fl      MPV 9.9 fL      Platelets 400 10*3/mm3      Neutrophil % 65.6 %      Lymphocyte % 23.1 %      Monocyte % 6.6 %      Eosinophil % 3.4 %      Basophil % 1.0 %      Immature Grans % 0.3 %      Neutrophils, Absolute 7.04 10*3/mm3      Lymphocytes, Absolute 2.48 10*3/mm3      Monocytes, Absolute 0.71 10*3/mm3      Eosinophils, Absolute 0.37 10*3/mm3      Basophils, Absolute 0.11 10*3/mm3      Immature Grans, Absolute 0.03 10*3/mm3      nRBC 0.0 /100 WBC     Blood Culture - Blood, Hand, Right [[id number]] Collected: 09/09/24 1519    Specimen: Blood from Hand, Right Updated: 09/09/24 1523    Blood Culture - Blood, Arm, Left [[id number]] Collected: 09/09/24 1519    Specimen: Blood from Arm, Left Updated: 09/09/24 1523    Urinalysis With Culture If Indicated - Urine, Clean Catch [[id number]]  (Abnormal) Collected: 09/09/24 1744    Specimen: Urine, Clean Catch Updated: 09/09/24 1753     Color, UA Yellow     Appearance, UA Turbid     pH, UA 6.0     Specific Gravity, UA 1.026     Glucose, UA Negative     Ketones, UA Negative     Bilirubin, UA Negative     Blood, UA Moderate (2+)     Protein, UA 30 mg/dL (1+)     Leuk Esterase, UA Large (3+)     Nitrite, UA Negative     Urobilinogen, UA 0.2 E.U./dL    Narrative:      In absence of clinical symptoms, the presence of pyuria, bacteria, and/or nitrites on the urinalysis result does not correlate with infection.    Urinalysis, Microscopic Only - Urine, Clean Catch [[id number]]  (Abnormal) Collected: 09/09/24 1744    Specimen: Urine, Clean Catch Updated: 09/09/24 1825     RBC, UA 3-5 /HPF      WBC, UA Too Numerous to Count /HPF      Bacteria, UA Trace /HPF      Squamous Epithelial Cells, UA 0-2 /HPF      Yeast, UA Small/1+ Yeast /HPF      Hyaline Casts, UA  None Seen /LPF      Methodology Manual Light Microscopy    Urine Culture - Urine, Urine, Clean Catch [[id number]] Collected: 09/09/24 1744    Specimen: Urine, Clean Catch Updated: 09/09/24 1825             Imaging:    CT Abdomen Pelvis With Contrast    Result Date: 9/9/2024  CT ABDOMEN PELVIS W CONTRAST Date of Exam: 9/9/2024 4:31 PM EDT Indication: History of intra-abdominal abscesses 2 months ago.  Patient still has drains possibly in place abdominal pain. Comparison: 7/5/2024 Technique: Axial CT images were obtained of the abdomen and pelvis after the uneventful intravenous administration of iodinated contrast. Reconstructed coronal and sagittal images were also obtained. Automated exposure control and iterative construction methods were used. Findings: Visualized Chest: Nonspecific mild reticulations noted within the periphery of the lungs. Otherwise the visualized chest is unremarkable. Liver: Liver is normal in size and CT density. No focal lesions. Gallbladder: Status post cholecystectomy Bile Ducts: No billiary dcutal dilation. Spleen: Spleen is normal in size and CT density. Pancreas: Pancreas is normal. There is no evidence of pancreatic mass or peripancreatic fluid. Adrenals: Adrenal glands are unremarkable. Kidneys: Kidneys are normal in size. There are no stones or hydronephrosis. Gastrointestinal: Unremarkable. Normal appendix. Bladder: The bladder is normal. Pelvis:  No suspecious mass. Peritoneum/Mesentery: 2 right posterolateral approach drainage catheters are noted. There is significant decrease in size to near resolution of previously noted right retroperitoneal fluid collection. There is mild persistent fat stranding and soft tissue prominence noted within the right retroperitoneal region adjacent to the right psoas muscle. No new fluid collection is noted. No free fluid or pneumoperitoneum   Lymph Nodes: No lymphadenopathy. Vasculature: Unremarkable Abdominal Wall: Soft tissue fat stranding noted  along the course of the pleural drainage catheters. Otherwise unremarkable Bony Structures: No acute osseous abnormality     Impression: Significantly decreased/near resolution of previously noted right retroperitoneal fluid collection. 2 right posterolateral approach drainage catheters are noted and appears to be in adequate position. There is persistent mild inflammatory changes noted within the right retroperitoneal soft tissue adjacent to the right psoas muscle. No new acute intra-abdominal intrapelvic abnormality. Electronically Signed: Shan Arshad DO  9/9/2024 4:56 PM EDT  Workstation ID: [id number]    XR Spine Lumbar 2 or 3 View    Result Date: 9/9/2024  XR SPINE LUMBAR 2 OR 3 VW Date of Exam: 9/9/2024 1:20 PM EDT Indication: drain placement confirmed Comparison: CT abdomen pelvis 7/25/2024 Findings: 2 surgical drains projecting over the right hemiabdomen, on lateral projection may be just within the retroperitoneum versus outside the retroperitoneum. No dilated loops of bowel to suggest ileus or obstruction. Moderate stool. Cholecystectomy clips. Mild lumbar levocurvature. Degenerative disc disease most severe at L5-S1. Facet arthropathy most severe in the lower lumbar spine. No significant spondylolisthesis. No visualized displaced fracture. Degenerative osteoarthritis of the SI joints.     Impression: 2 surgical drainage projecting over the right hemiabdomen, on lateral projection uncertain if drains terminate just within the retroperitoneum and within previously noted abscess versus within the subcutaneous tissues. Correlate with drain output. Electronically Signed: Vik Ingram MD  9/9/2024 1:29 PM EDT  Workstation ID: [id number]       Differential Diagnosis and Discussion:      Back Pain: The patient presents with back pain. My differential diagnosis includes but is not limited to acute spinal epidural abscess, acute spinal epidural bleed, cauda equina syndrome, abdominal aortic aneurysm, aortic  dissection, kidney stone, pyelonephritis, musculoskeletal back pain, spinal fracture, and osteoarthritis.   Fever: Based on the complaint of fever, differential diagnosis includes but is not limited to meningitis, pneumonia, pyelonephritis, acute uti,  systemic immune response syndrome, sepsis, viral syndrome, fungal infection, tick born illness and other bacterial infections.  Flank Pain: Differential diagnosis includes but is not limited to kidney stones, pyelonephritis, musculoskeletal disorders, renal infarction, urinary tract infection, hydronephrosis, radiculopathy, aortic aneurysm, renal cell carcinoma.    All labs were reviewed and interpreted by me.  All X-rays impressions were independently interpreted by me.  CT scan radiology impression was interpreted by me.    MDM     Amount and/or Complexity of Data Reviewed  Clinical lab tests: reviewed  Tests in the radiology section of CPT®: reviewed                     Patient Care Considerations:    SEPSIS was considered but is NOT present in the emergency department as SIRS criteria is not present.      Consultants/Shared Management Plan:    Consultant: I have discussed the case with general surgery on-call for [facility] who states patient does not meet admission/transfer criteria.  Recommends outpatient follow-up.    Social Determinants of Health:    Patient is independent, reliable, and has access to care.       Disposition and Care Coordination:    Discharged: The patient is suitable and stable for discharge with no need for consideration of admission.    I have explained the patient´s condition, diagnoses and treatment plan based on the information available to me at this time. I have answered questions and addressed any concerns. The patient has a good  understanding of the patient´s diagnosis, condition, and treatment plan as can be expected at this point. The vital signs have been stable. The patient´s condition is stable and  appropriate for discharge from the emergency department.      The patient will pursue further outpatient evaluation with the primary care physician or other designated or consulting physician as outlined in the discharge instructions. They are agreeable to this plan of care and follow-up instructions have been explained in detail. The patient has received these instructions in written format and has expressed an understanding of the discharge instructions. The patient is aware that any significant change in condition or worsening of symptoms should prompt an immediate return to this or the closest emergency department or call to 911.    Final diagnoses:   Acute UTI   Yeast vaginitis        ED Disposition       ED Disposition   Discharge    Condition   Stable    Comment   --               This medical record created using voice recognition software.             Jurgen Mauro MD  09/09/24 [id number]

## 2024-09-10 NOTE — DISCHARGE INSTRUCTIONS
Call [facility] general surgery clinic tomorrow to schedule an appointment to have your drains removed.  If you have any further concerns, they directed you to go immediately to [facility] emergency department to have your drains removed if you are unable to have this done as an outpatient.  Take antibiotics as prescribed for kidney infection.

## 2024-09-11 LAB — BACTERIA SPEC AEROBE CULT: ABNORMAL

## 2024-09-14 ENCOUNTER — HOSPITAL ENCOUNTER (EMERGENCY)
Facility: HOSPITAL | Age: 54
Discharge: HOME | End: 2024-09-14
Attending: EMERGENCY MEDICINE
Payer: COMMERCIAL

## 2024-09-14 ENCOUNTER — APPOINTMENT (OUTPATIENT)
Dept: CT IMAGING | Facility: HOSPITAL | Age: 54
End: 2024-09-14
Payer: COMMERCIAL

## 2024-09-14 VITALS
BODY MASS INDEX: 31.07 KG/M2 | DIASTOLIC BLOOD PRESSURE: 75 MMHG | HEART RATE: 84 BPM | SYSTOLIC BLOOD PRESSURE: 129 MMHG | RESPIRATION RATE: 16 BRPM | TEMPERATURE: 97.9 F | HEIGHT: 66 IN | OXYGEN SATURATION: 94 % | WEIGHT: 193.34 LBS

## 2024-09-14 DIAGNOSIS — T81.43XA POSTPROCEDURAL INTRAABDOMINAL ABSCESS: Primary | ICD-10-CM

## 2024-09-14 LAB
ALBUMIN SERPL-MCNC: 3.3 G/DL (ref 3.5–5.2)
ALBUMIN/GLOB SERPL: 0.9 G/DL
ALP SERPL-CCNC: 65 U/L (ref 39–117)
ALT SERPL W P-5'-P-CCNC: 10 U/L (ref 1–33)
ANION GAP SERPL CALCULATED.3IONS-SCNC: 9.6 MMOL/L (ref 5–15)
AST SERPL-CCNC: 11 U/L (ref 1–32)
BACTERIA SPEC AEROBE CULT: NORMAL
BACTERIA SPEC AEROBE CULT: NORMAL
BASOPHILS # BLD AUTO: 0.09 10*3/MM3 (ref 0–0.2)
BASOPHILS NFR BLD AUTO: 1.1 % (ref 0–1.5)
BILIRUB SERPL-MCNC: <0.2 MG/DL (ref 0–1.2)
BUN SERPL-MCNC: 19 MG/DL (ref 6–20)
BUN/CREAT SERPL: 32.8 (ref 7–25)
CALCIUM SPEC-SCNC: 9.3 MG/DL (ref 8.6–10.5)
CHLORIDE SERPL-SCNC: 103 MMOL/L (ref 98–107)
CO2 SERPL-SCNC: 22.4 MMOL/L (ref 22–29)
CREAT SERPL-MCNC: 0.58 MG/DL (ref 0.57–1)
DEPRECATED RDW RBC AUTO: 44.1 FL (ref 37–54)
EGFRCR SERPLBLD CKD-EPI 2021: 106.4 ML/MIN/1.73
EOSINOPHIL # BLD AUTO: 0.35 10*3/MM3 (ref 0–0.4)
EOSINOPHIL NFR BLD AUTO: 4.4 % (ref 0.3–6.2)
ERYTHROCYTE [DISTWIDTH] IN BLOOD BY AUTOMATED COUNT: 14 % (ref 12.3–15.4)
GLOBULIN UR ELPH-MCNC: 3.6 GM/DL
GLUCOSE SERPL-MCNC: 234 MG/DL (ref 65–99)
HCT VFR BLD AUTO: 33.9 % (ref 34–46.6)
HGB BLD-MCNC: 11.1 G/DL (ref 12–15.9)
HOLD SPECIMEN: NORMAL
HOLD SPECIMEN: NORMAL
IMM GRANULOCYTES # BLD AUTO: 0.02 10*3/MM3 (ref 0–0.05)
IMM GRANULOCYTES NFR BLD AUTO: 0.3 % (ref 0–0.5)
LYMPHOCYTES # BLD AUTO: 2.51 10*3/MM3 (ref 0.7–3.1)
LYMPHOCYTES NFR BLD AUTO: 31.7 % (ref 19.6–45.3)
MCH RBC QN AUTO: 28 PG (ref 26.6–33)
MCHC RBC AUTO-ENTMCNC: 32.7 G/DL (ref 31.5–35.7)
MCV RBC AUTO: 85.4 FL (ref 79–97)
MONOCYTES # BLD AUTO: 0.6 10*3/MM3 (ref 0.1–0.9)
MONOCYTES NFR BLD AUTO: 7.6 % (ref 5–12)
NEUTROPHILS NFR BLD AUTO: 4.35 10*3/MM3 (ref 1.7–7)
NEUTROPHILS NFR BLD AUTO: 54.9 % (ref 42.7–76)
NRBC BLD AUTO-RTO: 0 /100 WBC (ref 0–0.2)
PLATELET # BLD AUTO: 360 10*3/MM3 (ref 140–450)
PMV BLD AUTO: 10.2 FL (ref 6–12)
POTASSIUM SERPL-SCNC: 4.4 MMOL/L (ref 3.5–5.2)
PROT SERPL-MCNC: 6.9 G/DL (ref 6–8.5)
RBC # BLD AUTO: 3.97 10*6/MM3 (ref 3.77–5.28)
SODIUM SERPL-SCNC: 135 MMOL/L (ref 136–145)
WBC NRBC COR # BLD AUTO: 7.92 10*3/MM3 (ref 3.4–10.8)
WHOLE BLOOD HOLD COAG: NORMAL
WHOLE BLOOD HOLD SPECIMEN: NORMAL

## 2024-09-14 PROCEDURE — 25010000002 HYDROMORPHONE 1 MG/ML SOLUTION: Performed by: EMERGENCY MEDICINE

## 2024-09-14 PROCEDURE — 85025 COMPLETE CBC W/AUTO DIFF WBC: CPT | Performed by: EMERGENCY MEDICINE

## 2024-09-14 PROCEDURE — 99285 EMERGENCY DEPT VISIT HI MDM: CPT

## 2024-09-14 PROCEDURE — 80053 COMPREHEN METABOLIC PANEL: CPT | Performed by: EMERGENCY MEDICINE

## 2024-09-14 PROCEDURE — 87040 BLOOD CULTURE FOR BACTERIA: CPT | Performed by: EMERGENCY MEDICINE

## 2024-09-14 PROCEDURE — 25510000001 IOPAMIDOL PER 1 ML: Performed by: EMERGENCY MEDICINE

## 2024-09-14 PROCEDURE — 74177 CT ABD & PELVIS W/CONTRAST: CPT

## 2024-09-14 PROCEDURE — 96374 THER/PROPH/DIAG INJ IV PUSH: CPT

## 2024-09-14 RX ORDER — IOPAMIDOL 755 MG/ML
100 INJECTION, SOLUTION INTRAVASCULAR
Status: COMPLETED | OUTPATIENT
Start: 2024-09-14 | End: 2024-09-14

## 2024-09-14 RX ORDER — FLUCONAZOLE 150 MG/1
150 TABLET ORAL ONCE
COMMUNITY
Start: 2024-09-10

## 2024-09-14 RX ADMIN — IOPAMIDOL 100 ML: 755 INJECTION, SOLUTION INTRAVENOUS at 05:18

## 2024-09-14 RX ADMIN — HYDROMORPHONE HYDROCHLORIDE 0.5 MG: 1 INJECTION, SOLUTION INTRAMUSCULAR; INTRAVENOUS; SUBCUTANEOUS at 04:09

## 2024-09-14 NOTE — ED PROVIDER NOTES
Time: 3:06 AM EDT  Date of encounter:  9/14/2024  Independent Historian/Clinical History and Information was obtained by:   Patient and Chart    History is limited by: N/A    Chief Complaint: Right lower back pain      History of Present Illness:  Patient is a 54 y.o. year old female who presents to the emergency department for evaluation of right lower back pain    Patient states greater than 24 hours ago she was transferring from her wheelchair to the toilet when her right lower back drain fell out.  Since that time she has had continued pain in her right lower back which she states has been chronic since she had the drains placed.  She denies any fevers or chills.  No nausea or vomiting.    Patient Care Team  Primary Care Provider: Provider, Jania Known    Past Medical History:     Allergies   Allergen Reactions    Benadryl [Diphenhydramine] Hives     History reviewed. No pertinent past medical history.  Past Surgical History:   Procedure Laterality Date    ABDOMINAL WALL ABSCESS INCISION AND DRAINAGE      CHOLECYSTECTOMY       Family History   Problem Relation Age of Onset    Cancer Mother     Alcohol abuse Mother        Home Medications:  Prior to Admission medications    Medication Sig Start Date End Date Taking? Authorizing Provider   fluconazole (DIFLUCAN) 150 MG tablet Take 1 tablet by mouth 1 (One) Time. 9/10/24  Yes ProviderHosea MD   acetaminophen (TYLENOL) 325 MG tablet Take 2 tablets by mouth Every 4 (Four) Hours As Needed for Mild Pain.    ProviderHosea MD   cephalexin (KEFLEX) 500 MG capsule Take 1 capsule by mouth 3 (Three) Times a Day for 7 days. 9/9/24 9/16/24  Jurgen Mauro MD        Social History:   Social History     Tobacco Use    Smoking status: Every Day     Current packs/day: 1.00     Average packs/day: 1 pack/day for 42.7 years (42.7 ttl pk-yrs)     Types: Cigarettes     Start date: 1982    Smokeless tobacco: Never   Vaping Use    Vaping status: Never Used   Substance  "Use Topics    Alcohol use: Never    Drug use: Never         Review of Systems:  Review of Systems   Constitutional:  Negative for chills and fever.   HENT:  Negative for congestion, ear pain and sore throat.    Eyes:  Negative for pain.   Respiratory:  Negative for cough, chest tightness and shortness of breath.    Cardiovascular:  Negative for chest pain.   Gastrointestinal:  Negative for abdominal pain, diarrhea, nausea and vomiting.   Genitourinary:  Negative for flank pain and hematuria.   Musculoskeletal:  Positive for back pain. Negative for joint swelling.   Skin:  Positive for wound. Negative for pallor.   Neurological:  Negative for seizures and headaches.   All other systems reviewed and are negative.       Physical Exam:  /75   Pulse 84   Temp 97.9 °F (36.6 °C) (Oral)   Resp 16   Ht 167.6 cm (66\")   Wt 87.7 kg (193 lb 5.5 oz)   LMP  (LMP Unknown)   SpO2 94%   BMI 31.21 kg/m²     Physical Exam  Vitals and nursing note reviewed.   Constitutional:       General: She is not in acute distress.     Appearance: Normal appearance. She is not toxic-appearing.   HENT:      Head: Normocephalic and atraumatic.      Jaw: There is normal jaw occlusion.   Eyes:      General: Lids are normal.      Extraocular Movements: Extraocular movements intact.      Conjunctiva/sclera: Conjunctivae normal.      Pupils: Pupils are equal, round, and reactive to light.   Cardiovascular:      Rate and Rhythm: Normal rate and regular rhythm.      Pulses: Normal pulses.      Heart sounds: Normal heart sounds.   Pulmonary:      Effort: Pulmonary effort is normal. No respiratory distress.      Breath sounds: Normal breath sounds. No wheezing or rhonchi.   Abdominal:      General: Abdomen is flat.      Palpations: Abdomen is soft.      Tenderness: There is no abdominal tenderness. There is no guarding or rebound.   Musculoskeletal:         General: Normal range of motion.      Cervical back: Normal range of motion and neck " supple.      Right lower leg: No edema.      Left lower leg: No edema.   Skin:     General: Skin is warm and dry.      Comments: Right lower back drain site visible with clotted blood in place appears to be subacute does not appear to be an open drain site.    The more cranial drain appears to be in place but is not sutured in place.   Neurological:      Mental Status: She is alert and oriented to person, place, and time. Mental status is at baseline.   Psychiatric:         Mood and Affect: Mood normal.               Procedures:  Procedures      Medical Decision Making:      Comorbidities that affect care:    History of intra-abdominal abscesses    External Notes reviewed:    Hospital Discharge Summary: Hospital discharge summary 7/6/2024 and transfer to [facility] for drain replacement.      The following orders were placed and all results were independently analyzed by me:  Orders Placed This Encounter   Procedures    Blood Culture - Blood,    CT Abdomen Pelvis With Contrast    Comprehensive Metabolic Panel    CBC Auto Differential    [place] Draw    CBC & Differential    Green Top (Gel)    Lavender Top    Gold Top - SST    Light Blue Top       Medications Given in the Emergency Department:  Medications   HYDROmorphone (DILAUDID) injection 0.5 mg (0.5 mg Intravenous Given 9/14/24 0409)   iopamidol (ISOVUE-370) 76 % injection 100 mL (100 mL Intravenous Given 9/14/24 0518)        ED Course:         Labs:    Lab Results (last 24 hours)       Procedure Component Value Units Date/Time    CBC & Differential [[id number]]  (Abnormal) Collected: 09/14/24 0400    Specimen: Blood Updated: 09/14/24 0409    Narrative:      The following orders were created for panel order CBC & Differential.  Procedure                               Abnormality         Status                     ---------                               -----------         ------                     CBC Auto Differential[[id number]]        Abnormal             Final result                 Please view results for these tests on the individual orders.    Comprehensive Metabolic Panel [[id number]]  (Abnormal) Collected: 09/14/24 0400    Specimen: Blood Updated: 09/14/24 0439     Glucose 234 mg/dL      BUN 19 mg/dL      Creatinine 0.58 mg/dL      Sodium 135 mmol/L      Potassium 4.4 mmol/L      Comment: Slight hemolysis detected by analyzer. Result may be falsely elevated.        Chloride 103 mmol/L      CO2 22.4 mmol/L      Calcium 9.3 mg/dL      Total Protein 6.9 g/dL      Albumin 3.3 g/dL      ALT (SGPT) 10 U/L      AST (SGOT) 11 U/L      Alkaline Phosphatase 65 U/L      Total Bilirubin <0.2 mg/dL      Globulin 3.6 gm/dL      A/G Ratio 0.9 g/dL      BUN/Creatinine Ratio 32.8     Anion Gap 9.6 mmol/L      eGFR 106.4 mL/min/1.73     Narrative:      GFR Normal >60  Chronic Kidney Disease <60  Kidney Failure <15      Blood Culture - Blood, Arm, Left [[id number]] Collected: 09/14/24 0400    Specimen: Blood from Arm, Left Updated: 09/14/24 0405    CBC Auto Differential [[id number]]  (Abnormal) Collected: 09/14/24 0400    Specimen: Blood Updated: 09/14/24 0409     WBC 7.92 10*3/mm3      RBC 3.97 10*6/mm3      Hemoglobin 11.1 g/dL      Hematocrit 33.9 %      MCV 85.4 fL      MCH 28.0 pg      MCHC 32.7 g/dL      RDW 14.0 %      RDW-SD 44.1 fl      MPV 10.2 fL      Platelets 360 10*3/mm3      Neutrophil % 54.9 %      Lymphocyte % 31.7 %      Monocyte % 7.6 %      Eosinophil % 4.4 %      Basophil % 1.1 %      Immature Grans % 0.3 %      Neutrophils, Absolute 4.35 10*3/mm3      Lymphocytes, Absolute 2.51 10*3/mm3      Monocytes, Absolute 0.60 10*3/mm3      Eosinophils, Absolute 0.35 10*3/mm3      Basophils, Absolute 0.09 10*3/mm3      Immature Grans, Absolute 0.02 10*3/mm3      nRBC 0.0 /100 WBC              Imaging:    CT Abdomen Pelvis With Contrast    Result Date: 9/14/2024  CT ABDOMEN PELVIS W CONTRAST Date of Exam: 9/14/2024 5:09 AM EDT Indication: Displaced right lower back drain,  abdominal pain. Comparison: CT abdomen and pelvis with contrast 9/9/2024, 7/5/2024 Technique: Axial CT images were obtained of the abdomen and pelvis after the uneventful intravenous administration of iodinated contrast. Reconstructed coronal and sagittal images were also obtained. Automated exposure control and iterative construction methods were used. Findings: Included lung bases are clear. The more inferior percutaneous catheter has been removed. No fluid collection is seen at the previous location of its tip. The second percutaneous catheter remains in place, and retroperitoneal soft tissue thickening appears stable with no significant fluid collection. Kidneys, adrenal glands, spleen, pancreas, liver appear unremarkable. Gallbladder is surgically absent. No abnormal bowel distention is seen. There is stool throughout the colon. No urinary bladder, uterine, or adnexal abnormality is identified. There is  no significant free fluid. No acute osseous abnormality is identified. There are moderate degenerative changes in the lumbar spine.     Impression: 1.Interval removal of the more inferior percutaneous catheter with no fluid location seen at its previous location. 2.Stable retroperitoneal thickening adjacent to the tip of the remaining percutaneous catheter without significant associated fluid collection. Electronically Signed: Lenore Diggs  9/14/2024 5:39 AM EDT  Workstation ID: [id number]       Differential Diagnosis and Discussion:    Abdominal Pain: Based on the patient's signs and symptoms, I considered abdominal aortic aneurysm, small bowel obstruction, pancreatitis, acute cholecystitis, acute appendecitis, peptic ulcer disease, gastritis, colitis, endocrine disorders, irritable bowel syndrome and other differential diagnosis an etiology of the patient's abdominal pain.  Back Pain: The patient presents with back pain. My differential diagnosis includes but is not limited to acute spinal epidural abscess,  acute spinal epidural bleed, cauda equina syndrome, abdominal aortic aneurysm, aortic dissection, kidney stone, pyelonephritis, musculoskeletal back pain, spinal fracture, and osteoarthritis.     All labs were reviewed and interpreted by me.  CT scan radiology impression was interpreted by me.    MDM               Patient Care Considerations:    SEPSIS was considered but is NOT present in the emergency department as SIRS criteria is not present.      Consultants/Shared Management Plan:    None    Social Determinants of Health:    Patient is independent, reliable, and has access to care.       Disposition and Care Coordination:    Discharged: The patient is suitable and stable for discharge with no need for consideration of admission.    I have explained the patient´s condition, diagnoses and treatment plan based on the information available to me at this time. I have answered questions and addressed any concerns. The patient has a good  understanding of the patient´s diagnosis, condition, and treatment plan as can be expected at this point. The vital signs have been stable. The patient´s condition is stable and appropriate for discharge from the emergency department.      The patient will pursue further outpatient evaluation with the primary care physician or other designated or consulting physician as outlined in the discharge instructions. They are agreeable to this plan of care and follow-up instructions have been explained in detail. The patient has received these instructions in written format and has expressed an understanding of the discharge instructions. The patient is aware that any significant change in condition or worsening of symptoms should prompt an immediate return to this or the closest emergency department or call to 911.    Final diagnoses:   Postprocedural intraabdominal abscess        ED Disposition       ED Disposition   Discharge    Condition   Stable    Comment   --               This  medical record created using voice recognition software.             Vik Bee MD  09/14/24 5972

## 2024-09-19 LAB — BACTERIA SPEC AEROBE CULT: NORMAL

## 2024-10-08 ENCOUNTER — HOSPITAL ENCOUNTER (EMERGENCY)
Facility: HOSPITAL | Age: 54
Discharge: HOME | End: 2024-10-09
Attending: EMERGENCY MEDICINE
Payer: COMMERCIAL

## 2024-10-08 DIAGNOSIS — M25.562 ACUTE PAIN OF LEFT KNEE: Primary | ICD-10-CM

## 2024-10-08 PROCEDURE — 99283 EMERGENCY DEPT VISIT LOW MDM: CPT

## 2024-10-09 ENCOUNTER — APPOINTMENT (OUTPATIENT)
Dept: GENERAL RADIOLOGY | Facility: HOSPITAL | Age: 54
End: 2024-10-09
Payer: COMMERCIAL

## 2024-10-09 VITALS
DIASTOLIC BLOOD PRESSURE: 84 MMHG | OXYGEN SATURATION: 97 % | SYSTOLIC BLOOD PRESSURE: 139 MMHG | TEMPERATURE: 98.7 F | HEIGHT: 66 IN | BODY MASS INDEX: 29.34 KG/M2 | RESPIRATION RATE: 16 BRPM | WEIGHT: 182.54 LBS | HEART RATE: 90 BPM

## 2024-10-09 PROCEDURE — 96372 THER/PROPH/DIAG INJ SC/IM: CPT

## 2024-10-09 PROCEDURE — 25010000002 KETOROLAC TROMETHAMINE PER 15 MG: Performed by: EMERGENCY MEDICINE

## 2024-10-09 PROCEDURE — 73562 X-RAY EXAM OF KNEE 3: CPT

## 2024-10-09 RX ORDER — DICLOFENAC SODIUM 75 MG/1
75 TABLET, DELAYED RELEASE ORAL 2 TIMES DAILY PRN
Qty: 20 TABLET | Refills: 0 | Status: SHIPPED | OUTPATIENT
Start: 2024-10-09

## 2024-10-09 RX ORDER — KETOROLAC TROMETHAMINE 30 MG/ML
60 INJECTION, SOLUTION INTRAMUSCULAR; INTRAVENOUS ONCE
Status: COMPLETED | OUTPATIENT
Start: 2024-10-09 | End: 2024-10-09

## 2024-10-09 RX ADMIN — KETOROLAC TROMETHAMINE 60 MG: 30 INJECTION, SOLUTION INTRAMUSCULAR at 00:45

## 2024-10-09 NOTE — DISCHARGE INSTRUCTIONS
Apply cool compresses to the knee.  Apply for 20 to 30 minutes 3-4 times per day.  Wear the knee immobilizer for at least the next week or until symptoms resolve.  Take the diclofenac prescription as prescribed for pain.  If her symptoms or not improving after a week I would recommend that you follow-up with an orthopedic surgeon of your choice.  I did provide you with Dr. Ferguson's contact information as he has our on-call orthopedic surgeon today.  Follow-up with your primary care provider as needed.  Return to the ER for any change or worsening pain, swelling, or any other concerns issues that may arise.

## 2024-10-09 NOTE — ED PROVIDER NOTES
Time: 1:16 AM EDT  Date of encounter:  10/8/2024  Independent Historian/Clinical History and Information was obtained by:   Patient  Chief Complaint: Left knee pain    History is limited by: N/A    History of Present Illness:  Patient is a 54 y.o. year old female who presents to the emergency department for evaluation of left knee pain.  Patient reports symptoms began 2 days ago.  Patient reports it began while she was in bed.  Patient states it feels like there is a severe pinching sensation going on involving her left knee.  Patient reports the pain has been intermittent.  She denies any recent trauma or injury.  Patient also reports that the feels like her kneecap is moving around.  Patient states that hot showers seem to improve her symptoms somewhat.  She states that laying supine or sitting makes the pain worse.  Patient has never had anything like this before.    HPI    Patient Care Team  Primary Care Provider: Provider, No Known    Past Medical History:     Allergies   Allergen Reactions    Benadryl [Diphenhydramine] Hives     History reviewed. No pertinent past medical history.  Past Surgical History:   Procedure Laterality Date    ABDOMINAL WALL ABSCESS INCISION AND DRAINAGE      CHOLECYSTECTOMY       Family History   Problem Relation Age of Onset    Cancer Mother     Alcohol abuse Mother        Home Medications:  Prior to Admission medications    Medication Sig Start Date End Date Taking? Authorizing Provider   acetaminophen (TYLENOL) 325 MG tablet Take 2 tablets by mouth Every 4 (Four) Hours As Needed for Mild Pain.    Hosea Preston MD   fluconazole (DIFLUCAN) 150 MG tablet Take 1 tablet by mouth 1 (One) Time. 9/10/24   Hosea Preston MD        Social History:   Social History     Tobacco Use    Smoking status: Every Day     Current packs/day: 1.00     Average packs/day: 1 pack/day for 42.8 years (42.8 ttl pk-yrs)     Types: Cigarettes     Start date: 1982    Smokeless tobacco: Never  "  Vaping Use    Vaping status: Never Used   Substance Use Topics    Alcohol use: Never    Drug use: Never         Review of Systems:  Review of Systems   Constitutional:  Negative for chills and fever.   HENT:  Negative for congestion, ear pain and sore throat.    Eyes:  Negative for pain.   Respiratory:  Negative for cough, chest tightness and shortness of breath.    Cardiovascular:  Negative for chest pain.   Gastrointestinal:  Negative for abdominal pain, diarrhea, nausea and vomiting.   Genitourinary:  Negative for flank pain and hematuria.   Musculoskeletal:  Positive for arthralgias. Negative for joint swelling.   Skin:  Negative for pallor.   Neurological:  Negative for seizures and headaches.   All other systems reviewed and are negative.       Physical Exam:  /84   Pulse 90   Temp 98.7 °F (37.1 °C) (Oral)   Resp 16   Ht 167.6 cm (66\")   Wt 82.8 kg (182 lb 8.7 oz)   LMP  (LMP Unknown)   SpO2 97%   BMI 29.46 kg/m²     Physical Exam    Vital signs were reviewed under triage note.  General appearance - Patient appears well-developed and well-nourished.  Patient is in no acute distress.  Head - Normocephalic, atraumatic.  Pupils - Equal, round, reactive to light.  Extraocular muscles are intact.  Conjunctiva is clear.  Nasal - Normal inspection.  No evidence of trauma or epistaxis.  Tympanic membranes - Gray, intact without erythema or retractions.  Oral mucosa - Pink and moist without lesions or erythema.  Uvula is midline.  Chest wall - Atraumatic.  Chest wall is nontender.  There are no vesicular rashes noted.  Neck - Supple.  Trachea was midline.  There is no palpable lymphadenopathy or thyromegaly.  There are no meningeal signs  Lungs - Clear to auscultation and percussion bilaterally.  Heart - Regular rate and rhythm without any murmurs, clicks, or gallops.  Abdomen - Soft.  Bowel sounds are present.  There is no palpable tenderness.  There is no rebound, guarding, or rigidity.  There are no " palpable masses.  There are no pulsatile masses.  Back - Spine is straight and midline.  There is no CVA tenderness.  Extremities - Intact x4 with full range of motion.  There are some crepitance with range of motion of the left knee.  Patient has some mild soft tissue swelling.  There is no bruising or ecchymosis noted.  There is no laxity with joint testing.  There is no palpable edema.  Pulses are intact x4 and equal.  Neurologic - Patient is awake, alert, and oriented x3.  Cranial nerves II through XII are grossly intact.  Motor and sensory functions grossly intact.  Cerebellar function was normal.  Integument - There are no rashes.  There are no petechia or purpura lesions noted.  There are no vesicular lesions noted.           Procedures:  Procedures      Medical Decision Making:      Comorbidities that affect care:    Perinephric abscess    External Notes reviewed:    Previous Clinic Note: Clinic note from 9/25/2024 was reviewed by me.      The following orders were placed and all results were independently analyzed by me:  Orders Placed This Encounter   Procedures    [facility] 05.  Knee Immobilizer    XR Knee 3+ View With [place] Left       Medications Given in the Emergency Department:  Medications   ketorolac (TORADOL) injection 60 mg (60 mg Intramuscular Given 10/9/24 0045)        ED Course:    The patient was seen and evaluated in the ED by me.  The above history and physical examination was performed as documented.  Diagnostic data was obtained.  Results reviewed.  Findings were discussed with the patient.  Patient was placed in the immobilizer.  Patient placed on NSAID therapy.  Patient vies to follow with her primary care provider if symptoms are not improved.  I also provided our on-call orthopedic surgeon as a contact for orthopedic follow-up as well.  Patient is agreeable to this treatment plan.    Labs:    Lab Results (last 24 hours)       ** No results found for the last 24 hours. **              Imaging:    XR Knee 3+ View With [place] Left    Result Date: 10/9/2024  XR KNEE 3+ VW W [place] LEFT-  Date of exam: 10/9/2024, 12:30 A.M.  Indication: Left knee pain.  Comparison: None available.  FINDINGS: Four (4) nonweightbearing views of the left knee were obtained, including a dedicated left patellar view. No acute fracture or acute malalignment is identified. Mild-to-moderate degenerative changes are seen. Arterial calcifications are present. Minimal, if any, joint effusion is suggested. No subcutaneous emphysema. No retained radiopaque foreign body. If symptoms or clinical concerns persist, consider imaging follow-up.       No acute fracture or acute malalignment is identified. Please see above comments for further detail.   Portions of this note were completed with a voice recognition program.   Electronically Signed By-Oscar Solorzano MD On:10/9/2024 12:47 AM         Differential Diagnosis and Discussion:    Extremity Pain: Differential diagnosis includes but is not limited to soft tissue sprain, tendonitis, tendon injury, dislocation, fracture, deep vein thrombosis, arterial insufficiency, osteoarthritis, bursitis, and ligamentous damage.    All X-rays impressions were independently interpreted by me.    MDM     Amount and/or Complexity of Data Reviewed  Tests in the radiology section of CPT®: reviewed             Patient Care Considerations:    NARCOTICS: I considered prescribing opiate pain medication as an outpatient, however there were no findings that would warrant opioid pain analgesia      Consultants/Shared Management Plan:    None    Social Determinants of Health:    Patient is independent, reliable, and has access to care.       Disposition and Care Coordination:    Discharged: The patient is suitable and stable for discharge with no need for consideration of admission.    I have explained the patient´s condition, diagnoses and treatment plan based on the information available to me  at this time. I have answered questions and addressed any concerns. The patient has a good  understanding of the patient´s diagnosis, condition, and treatment plan as can be expected at this point. The vital signs have been stable. The patient´s condition is stable and appropriate for discharge from the emergency department.      The patient will pursue further outpatient evaluation with the primary care physician or other designated or consulting physician as outlined in the discharge instructions. They are agreeable to this plan of care and follow-up instructions have been explained in detail. The patient has received these instructions in written format and has expressed an understanding of the discharge instructions. The patient is aware that any significant change in condition or worsening of symptoms should prompt an immediate return to this or the closest emergency department or call to 911.    Final diagnoses:   Acute pain of left knee        ED Disposition       ED Disposition   Discharge    Condition   Stable    Comment   --               This medical record created using voice recognition software.             Devin Giron DO  10/13/24 9524

## 2024-11-08 ENCOUNTER — HOSPITAL ENCOUNTER (EMERGENCY)
Facility: HOSPITAL | Age: 54
Discharge: HOME | End: 2024-11-08
Attending: EMERGENCY MEDICINE
Payer: COMMERCIAL

## 2024-11-08 VITALS
HEIGHT: 66 IN | RESPIRATION RATE: 17 BRPM | OXYGEN SATURATION: 96 % | HEART RATE: 87 BPM | WEIGHT: 182.76 LBS | SYSTOLIC BLOOD PRESSURE: 136 MMHG | BODY MASS INDEX: 29.37 KG/M2 | TEMPERATURE: 98.1 F | DIASTOLIC BLOOD PRESSURE: 77 MMHG

## 2024-11-08 VITALS
RESPIRATION RATE: 18 BRPM | SYSTOLIC BLOOD PRESSURE: 150 MMHG | WEIGHT: 182.76 LBS | DIASTOLIC BLOOD PRESSURE: 86 MMHG | HEIGHT: 66 IN | BODY MASS INDEX: 29.37 KG/M2 | HEART RATE: 95 BPM | TEMPERATURE: 97.7 F | OXYGEN SATURATION: 95 %

## 2024-11-08 DIAGNOSIS — R19.7 DIARRHEA, UNSPECIFIED TYPE: Primary | ICD-10-CM

## 2024-11-08 LAB
ALBUMIN SERPL-MCNC: 3.8 G/DL (ref 3.5–5.2)
ALBUMIN SERPL-MCNC: 3.9 G/DL (ref 3.5–5.2)
ALBUMIN/GLOB SERPL: 1.1 G/DL
ALBUMIN/GLOB SERPL: 1.1 G/DL
ALP SERPL-CCNC: 93 U/L (ref 39–117)
ALP SERPL-CCNC: 94 U/L (ref 39–117)
ALT SERPL W P-5'-P-CCNC: 12 U/L (ref 1–33)
ALT SERPL W P-5'-P-CCNC: 12 U/L (ref 1–33)
ANION GAP SERPL CALCULATED.3IONS-SCNC: 10.5 MMOL/L (ref 5–15)
ANION GAP SERPL CALCULATED.3IONS-SCNC: 9.5 MMOL/L (ref 5–15)
AST SERPL-CCNC: 11 U/L (ref 1–32)
AST SERPL-CCNC: 12 U/L (ref 1–32)
BACTERIA UR QL AUTO: ABNORMAL /HPF
BASOPHILS # BLD AUTO: 0.05 10*3/MM3 (ref 0–0.2)
BASOPHILS # BLD AUTO: 0.06 10*3/MM3 (ref 0–0.2)
BASOPHILS NFR BLD AUTO: 0.6 % (ref 0–1.5)
BASOPHILS NFR BLD AUTO: 0.6 % (ref 0–1.5)
BILIRUB SERPL-MCNC: 0.2 MG/DL (ref 0–1.2)
BILIRUB SERPL-MCNC: 0.2 MG/DL (ref 0–1.2)
BILIRUB UR QL STRIP: NEGATIVE
BUN SERPL-MCNC: 17 MG/DL (ref 6–20)
BUN SERPL-MCNC: 17 MG/DL (ref 6–20)
BUN/CREAT SERPL: 21.8 (ref 7–25)
BUN/CREAT SERPL: 30.9 (ref 7–25)
CALCIUM SPEC-SCNC: 9.3 MG/DL (ref 8.6–10.5)
CALCIUM SPEC-SCNC: 9.6 MG/DL (ref 8.6–10.5)
CHLORIDE SERPL-SCNC: 104 MMOL/L (ref 98–107)
CHLORIDE SERPL-SCNC: 105 MMOL/L (ref 98–107)
CLARITY UR: CLEAR
CO2 SERPL-SCNC: 22.5 MMOL/L (ref 22–29)
CO2 SERPL-SCNC: 22.5 MMOL/L (ref 22–29)
COLOR UR: YELLOW
CREAT SERPL-MCNC: 0.55 MG/DL (ref 0.57–1)
CREAT SERPL-MCNC: 0.78 MG/DL (ref 0.57–1)
D-LACTATE SERPL-SCNC: 0.9 MMOL/L (ref 0.5–2)
D-LACTATE SERPL-SCNC: 1.5 MMOL/L (ref 0.5–2)
DEPRECATED RDW RBC AUTO: 39 FL (ref 37–54)
DEPRECATED RDW RBC AUTO: 39.8 FL (ref 37–54)
EGFRCR SERPLBLD CKD-EPI 2021: 107.7 ML/MIN/1.73
EGFRCR SERPLBLD CKD-EPI 2021: 89.3 ML/MIN/1.73
EOSINOPHIL # BLD AUTO: 0.21 10*3/MM3 (ref 0–0.4)
EOSINOPHIL # BLD AUTO: 0.21 10*3/MM3 (ref 0–0.4)
EOSINOPHIL NFR BLD AUTO: 2.1 % (ref 0.3–6.2)
EOSINOPHIL NFR BLD AUTO: 2.7 % (ref 0.3–6.2)
ERYTHROCYTE [DISTWIDTH] IN BLOOD BY AUTOMATED COUNT: 12.6 % (ref 12.3–15.4)
ERYTHROCYTE [DISTWIDTH] IN BLOOD BY AUTOMATED COUNT: 12.8 % (ref 12.3–15.4)
GLOBULIN UR ELPH-MCNC: 3.4 GM/DL
GLOBULIN UR ELPH-MCNC: 3.5 GM/DL
GLUCOSE BLDC GLUCOMTR-MCNC: 147 MG/DL (ref 70–99)
GLUCOSE SERPL-MCNC: 168 MG/DL (ref 65–99)
GLUCOSE SERPL-MCNC: 206 MG/DL (ref 65–99)
GLUCOSE UR STRIP-MCNC: NEGATIVE MG/DL
HCT VFR BLD AUTO: 38.9 % (ref 34–46.6)
HCT VFR BLD AUTO: 39.4 % (ref 34–46.6)
HGB BLD-MCNC: 12.9 G/DL (ref 12–15.9)
HGB BLD-MCNC: 12.9 G/DL (ref 12–15.9)
HGB UR QL STRIP.AUTO: NEGATIVE
HOLD SPECIMEN: NORMAL
HYALINE CASTS UR QL AUTO: ABNORMAL /LPF
IMM GRANULOCYTES # BLD AUTO: 0.02 10*3/MM3 (ref 0–0.05)
IMM GRANULOCYTES # BLD AUTO: 0.02 10*3/MM3 (ref 0–0.05)
IMM GRANULOCYTES NFR BLD AUTO: 0.2 % (ref 0–0.5)
IMM GRANULOCYTES NFR BLD AUTO: 0.3 % (ref 0–0.5)
KETONES UR QL STRIP: NEGATIVE
LEUKOCYTE ESTERASE UR QL STRIP.AUTO: ABNORMAL
LIPASE SERPL-CCNC: 32 U/L (ref 13–60)
LIPASE SERPL-CCNC: 38 U/L (ref 13–60)
LYMPHOCYTES # BLD AUTO: 2.26 10*3/MM3 (ref 0.7–3.1)
LYMPHOCYTES # BLD AUTO: 2.73 10*3/MM3 (ref 0.7–3.1)
LYMPHOCYTES NFR BLD AUTO: 27.7 % (ref 19.6–45.3)
LYMPHOCYTES NFR BLD AUTO: 29.1 % (ref 19.6–45.3)
MCH RBC QN AUTO: 27.9 PG (ref 26.6–33)
MCH RBC QN AUTO: 28.4 PG (ref 26.6–33)
MCHC RBC AUTO-ENTMCNC: 32.7 G/DL (ref 31.5–35.7)
MCHC RBC AUTO-ENTMCNC: 33.2 G/DL (ref 31.5–35.7)
MCV RBC AUTO: 85.3 FL (ref 79–97)
MCV RBC AUTO: 85.7 FL (ref 79–97)
MONOCYTES # BLD AUTO: 0.51 10*3/MM3 (ref 0.1–0.9)
MONOCYTES # BLD AUTO: 0.63 10*3/MM3 (ref 0.1–0.9)
MONOCYTES NFR BLD AUTO: 6.4 % (ref 5–12)
MONOCYTES NFR BLD AUTO: 6.6 % (ref 5–12)
NEUTROPHILS NFR BLD AUTO: 4.72 10*3/MM3 (ref 1.7–7)
NEUTROPHILS NFR BLD AUTO: 6.21 10*3/MM3 (ref 1.7–7)
NEUTROPHILS NFR BLD AUTO: 60.7 % (ref 42.7–76)
NEUTROPHILS NFR BLD AUTO: 63 % (ref 42.7–76)
NITRITE UR QL STRIP: NEGATIVE
NRBC BLD AUTO-RTO: 0 /100 WBC (ref 0–0.2)
NRBC BLD AUTO-RTO: 0 /100 WBC (ref 0–0.2)
PH UR STRIP.AUTO: 6.5 [PH] (ref 5–8)
PLATELET # BLD AUTO: 284 10*3/MM3 (ref 140–450)
PLATELET # BLD AUTO: 296 10*3/MM3 (ref 140–450)
PMV BLD AUTO: 10.6 FL (ref 6–12)
PMV BLD AUTO: 10.7 FL (ref 6–12)
POTASSIUM SERPL-SCNC: 4 MMOL/L (ref 3.5–5.2)
POTASSIUM SERPL-SCNC: 4.1 MMOL/L (ref 3.5–5.2)
PROT SERPL-MCNC: 7.3 G/DL (ref 6–8.5)
PROT SERPL-MCNC: 7.3 G/DL (ref 6–8.5)
PROT UR QL STRIP: NEGATIVE
RBC # BLD AUTO: 4.54 10*6/MM3 (ref 3.77–5.28)
RBC # BLD AUTO: 4.62 10*6/MM3 (ref 3.77–5.28)
RBC # UR STRIP: ABNORMAL /HPF
REF LAB TEST METHOD: ABNORMAL
SODIUM SERPL-SCNC: 137 MMOL/L (ref 136–145)
SODIUM SERPL-SCNC: 137 MMOL/L (ref 136–145)
SP GR UR STRIP: <=1.005 (ref 1–1.03)
SQUAMOUS #/AREA URNS HPF: ABNORMAL /HPF
UROBILINOGEN UR QL STRIP: ABNORMAL
WBC # UR STRIP: ABNORMAL /HPF
WBC NRBC COR # BLD AUTO: 7.77 10*3/MM3 (ref 3.4–10.8)
WBC NRBC COR # BLD AUTO: 9.86 10*3/MM3 (ref 3.4–10.8)
WHOLE BLOOD HOLD COAG: NORMAL
WHOLE BLOOD HOLD COAG: NORMAL
WHOLE BLOOD HOLD SPECIMEN: NORMAL
WHOLE BLOOD HOLD SPECIMEN: NORMAL

## 2024-11-08 PROCEDURE — 82948 REAGENT STRIP/BLOOD GLUCOSE: CPT

## 2024-11-08 PROCEDURE — 99211 OFF/OP EST MAY X REQ PHY/QHP: CPT | Performed by: EMERGENCY MEDICINE

## 2024-11-08 PROCEDURE — 83605 ASSAY OF LACTIC ACID: CPT

## 2024-11-08 PROCEDURE — 99283 EMERGENCY DEPT VISIT LOW MDM: CPT

## 2024-11-08 PROCEDURE — 85025 COMPLETE CBC W/AUTO DIFF WBC: CPT

## 2024-11-08 PROCEDURE — 81001 URINALYSIS AUTO W/SCOPE: CPT | Performed by: EMERGENCY MEDICINE

## 2024-11-08 PROCEDURE — 80053 COMPREHEN METABOLIC PANEL: CPT | Performed by: EMERGENCY MEDICINE

## 2024-11-08 PROCEDURE — 25010000002 ONDANSETRON PER 1 MG: Performed by: EMERGENCY MEDICINE

## 2024-11-08 PROCEDURE — 25810000003 SODIUM CHLORIDE 0.9 % SOLUTION: Performed by: EMERGENCY MEDICINE

## 2024-11-08 PROCEDURE — 96374 THER/PROPH/DIAG INJ IV PUSH: CPT

## 2024-11-08 PROCEDURE — 83605 ASSAY OF LACTIC ACID: CPT | Performed by: EMERGENCY MEDICINE

## 2024-11-08 PROCEDURE — 96375 TX/PRO/DX INJ NEW DRUG ADDON: CPT

## 2024-11-08 PROCEDURE — 36415 COLL VENOUS BLD VENIPUNCTURE: CPT

## 2024-11-08 PROCEDURE — 81001 URINALYSIS AUTO W/SCOPE: CPT

## 2024-11-08 PROCEDURE — 83690 ASSAY OF LIPASE: CPT

## 2024-11-08 PROCEDURE — 83690 ASSAY OF LIPASE: CPT | Performed by: EMERGENCY MEDICINE

## 2024-11-08 PROCEDURE — 25010000002 KETOROLAC TROMETHAMINE PER 15 MG: Performed by: EMERGENCY MEDICINE

## 2024-11-08 PROCEDURE — 80053 COMPREHEN METABOLIC PANEL: CPT

## 2024-11-08 PROCEDURE — 85025 COMPLETE CBC W/AUTO DIFF WBC: CPT | Performed by: EMERGENCY MEDICINE

## 2024-11-08 RX ORDER — KETOROLAC TROMETHAMINE 30 MG/ML
30 INJECTION, SOLUTION INTRAMUSCULAR; INTRAVENOUS ONCE
Status: COMPLETED | OUTPATIENT
Start: 2024-11-08 | End: 2024-11-08

## 2024-11-08 RX ORDER — SODIUM CHLORIDE 0.9 % (FLUSH) 0.9 %
10 SYRINGE (ML) INJECTION AS NEEDED
Status: DISCONTINUED | OUTPATIENT
Start: 2024-11-08 | End: 2024-11-09 | Stop reason: HOSPADM

## 2024-11-08 RX ORDER — FAMOTIDINE 10 MG/ML
20 INJECTION, SOLUTION INTRAVENOUS ONCE
Status: COMPLETED | OUTPATIENT
Start: 2024-11-08 | End: 2024-11-08

## 2024-11-08 RX ORDER — SODIUM CHLORIDE 0.9 % (FLUSH) 0.9 %
10 SYRINGE (ML) INJECTION AS NEEDED
Status: DISCONTINUED | OUTPATIENT
Start: 2024-11-08 | End: 2024-11-08 | Stop reason: HOSPADM

## 2024-11-08 RX ORDER — ONDANSETRON 4 MG/1
4 TABLET, ORALLY DISINTEGRATING ORAL EVERY 8 HOURS PRN
Qty: 15 TABLET | Refills: 0 | Status: SHIPPED | OUTPATIENT
Start: 2024-11-08

## 2024-11-08 RX ORDER — ONDANSETRON 2 MG/ML
4 INJECTION INTRAMUSCULAR; INTRAVENOUS ONCE
Status: COMPLETED | OUTPATIENT
Start: 2024-11-08 | End: 2024-11-08

## 2024-11-08 RX ADMIN — ONDANSETRON 4 MG: 2 INJECTION INTRAMUSCULAR; INTRAVENOUS at 22:54

## 2024-11-08 RX ADMIN — SODIUM CHLORIDE 1000 ML: 9 INJECTION, SOLUTION INTRAVENOUS at 22:55

## 2024-11-08 RX ADMIN — FAMOTIDINE 20 MG: 10 INJECTION INTRAVENOUS at 22:54

## 2024-11-08 RX ADMIN — KETOROLAC TROMETHAMINE 30 MG: 30 INJECTION, SOLUTION INTRAMUSCULAR; INTRAVENOUS at 22:54

## 2024-11-08 NOTE — ED PROVIDER NOTES
Time: 1:37 PM EST  Date of encounter:  11/8/2024  Independent Historian/Clinical History and Information was obtained by:   Patient and EMS  Chief Complaint: Nausea vomiting and diarrhea    History is limited by: N/A    History of Present Illness:  Patient is a 54 y.o. year old female who presents to the emergency department for evaluation of nausea vomiting and diarrhea.  Patient reports diarrhea began around 5 AM this morning.  She reports she has had approximately 6 episodes of diarrhea and 1 episode of vomiting.  Patient denies any fevers.  Patient denies any abdominal pain.  Patient was administered Zofran by EMS.  Patient denies any hematuria or dysuria as well.    HPI    Patient Care Team  Primary Care Provider: Provider, Jania Known    Past Medical History:     Allergies   Allergen Reactions    Benadryl [Diphenhydramine] Hives     History reviewed. No pertinent past medical history.  Past Surgical History:   Procedure Laterality Date    ABDOMINAL WALL ABSCESS INCISION AND DRAINAGE      CHOLECYSTECTOMY       Family History   Problem Relation Age of Onset    Cancer Mother     Alcohol abuse Mother        Home Medications:  Prior to Admission medications    Medication Sig Start Date End Date Taking? Authorizing Provider   acetaminophen (TYLENOL) 325 MG tablet Take 2 tablets by mouth Every 4 (Four) Hours As Needed for Mild Pain.    Hosea Preston MD   diclofenac (VOLTAREN) 75 MG EC tablet Take 1 tablet by mouth 2 (Two) Times a Day As Needed (Pain). 10/9/24   Devin Giron DO   fluconazole (DIFLUCAN) 150 MG tablet Take 1 tablet by mouth 1 (One) Time. 9/10/24   ProviderHosea MD        Social History:   Social History     Tobacco Use    Smoking status: Every Day     Current packs/day: 1.00     Average packs/day: 1 pack/day for 42.9 years (42.9 ttl pk-yrs)     Types: Cigarettes     Start date: 1982    Smokeless tobacco: Never   Vaping Use    Vaping status: Never Used   Substance Use Topics    Alcohol use:  "Never    Drug use: Never         Review of Systems:  Review of Systems   Constitutional:  Negative for chills and fever.   HENT:  Negative for congestion, ear pain and sore throat.    Eyes:  Negative for pain.   Respiratory:  Negative for cough, chest tightness and shortness of breath.    Cardiovascular:  Negative for chest pain.   Gastrointestinal:  Positive for diarrhea, nausea and vomiting. Negative for abdominal pain.   Genitourinary:  Negative for flank pain and hematuria.   Musculoskeletal:  Negative for joint swelling.   Skin:  Negative for pallor.   Neurological:  Negative for seizures and headaches.   All other systems reviewed and are negative.       Physical Exam:  /86   Pulse 95   Temp 97.7 °F (36.5 °C) (Oral)   Resp 18   Ht 167.6 cm (66\")   Wt 82.9 kg (182 lb 12.2 oz)   LMP  (LMP Unknown)   SpO2 95%   BMI 29.50 kg/m²     Physical Exam    Vital signs were reviewed under triage note.  General appearance - Patient appears well-developed and well-nourished.  Patient is in no acute distress.  Head - Normocephalic, atraumatic.  Pupils - Equal, round, reactive to light.  Extraocular muscles are intact.  Conjunctiva is clear.  Nasal - Normal inspection.  No evidence of trauma or epistaxis.  Tympanic membranes - Gray, intact without erythema or retractions.  Oral mucosa - Pink and moist without lesions or erythema.  Uvula is midline.  Chest wall - Atraumatic.  Chest wall is nontender.  There are no vesicular rashes noted.  Neck - Supple.  Trachea was midline.  There is no palpable lymphadenopathy or thyromegaly.  There are no meningeal signs  Lungs - Clear to auscultation and percussion bilaterally.  Heart - Regular rate and rhythm without any murmurs, clicks, or gallops.  Abdomen - Soft.  Bowel sounds are present.  There is no palpable tenderness.  There is no rebound, guarding, or rigidity.  There are no palpable masses.  There are no pulsatile masses.  Back - Spine is straight and midline.  " There is no CVA tenderness.  Extremities - Intact x4 with full range of motion.  There is no palpable edema.  Pulses are intact x4 and equal.  Neurologic - Patient is awake, alert, and oriented x3.  Cranial nerves II through XII are grossly intact.  Motor and sensory functions grossly intact.  Cerebellar function was normal.  Integument - There are no rashes.  There are no petechia or purpura lesions noted.  There are no vesicular lesions noted.           Procedures:  Procedures      Medical Decision Making:      Comorbidities that affect care:    Diabetes    External Notes reviewed:    EMS Run sheet: EMS run sheet was reviewed by me.  Patient was administered Zofran and route.      The following orders were placed and all results were independently analyzed by me:  Orders Placed This Encounter   Procedures    [place] Draw    Comprehensive Metabolic Panel    Lipase    Urinalysis With Microscopic If Indicated (No Culture) - Urine, Clean Catch    Lactic Acid, Plasma    CBC Auto Differential    Urinalysis, Microscopic Only - Urine, Clean Catch    NPO Diet NPO Type: Strict NPO    Undress & Gown    POC Glucose Once    Insert Peripheral IV    CBC & Differential    Green Top (Gel)    Lavender Top    Gold Top - SST    Light Blue Top       Medications Given in the Emergency Department:  Medications   sodium chloride 0.9 % flush 10 mL (has no administration in time range)        ED Course:     The patient was seen and evaluated in the ED by me.  The above history and physical examination was performed as documented.  Diagnostic data was obtained.  Results reviewed.  Findings were discussed with the patient.  The patient has had no additional vomiting or diarrhea in the ED.  Patient has eaten while in the ED without any complications.  Patient is stable for discharge home at this time.  Patient most likely has a viral gastroenteritis.  If the patient symptoms change or worsen she will need to return to the ER at which point  further imaging may be needed.  Patient is aware this treatment plan.  Patient is agreeable to this.    Labs:    Lab Results (last 24 hours)       Procedure Component Value Units Date/Time    POC Glucose Once [[id number]]  (Abnormal) Collected: 11/08/24 1009    Specimen: Blood Updated: 11/08/24 1011     Glucose 147 mg/dL      Comment: Serial Number: [id number]:  [id number]       CBC & Differential [[id number]]  (Normal) Collected: 11/08/24 1102    Specimen: Blood Updated: 11/08/24 1114    Narrative:      The following orders were created for panel order CBC & Differential.  Procedure                               Abnormality         Status                     ---------                               -----------         ------                     CBC Auto Differential[[id number]]        Normal              Final result                 Please view results for these tests on the individual orders.    Comprehensive Metabolic Panel [[id number]]  (Abnormal) Collected: 11/08/24 1102    Specimen: Blood Updated: 11/08/24 1135     Glucose 168 mg/dL      BUN 17 mg/dL      Creatinine 0.55 mg/dL      Sodium 137 mmol/L      Potassium 4.1 mmol/L      Comment: Slight hemolysis detected by analyzer. Result may be falsely elevated.        Chloride 105 mmol/L      CO2 22.5 mmol/L      Calcium 9.6 mg/dL      Total Protein 7.3 g/dL      Albumin 3.8 g/dL      ALT (SGPT) 12 U/L      AST (SGOT) 12 U/L      Alkaline Phosphatase 93 U/L      Total Bilirubin 0.2 mg/dL      Globulin 3.5 gm/dL      A/G Ratio 1.1 g/dL      BUN/Creatinine Ratio 30.9     Anion Gap 9.5 mmol/L      eGFR 107.7 mL/min/1.73     Narrative:      GFR Normal >60  Chronic Kidney Disease <60  Kidney Failure <15      Lipase [[id number]]  (Normal) Collected: 11/08/24 1102    Specimen: Blood Updated: 11/08/24 1135     Lipase 32 U/L     Urinalysis With Microscopic If Indicated (No Culture) - Urine, Clean Catch [[id number]]  (Abnormal) Collected: 11/08/24 1102    Specimen: Urine,  Clean Catch Updated: 11/08/24 1116     Color, UA Yellow     Appearance, UA Clear     pH, UA 6.5     Specific Gravity, UA <=1.005     Glucose, UA Negative     Ketones, UA Negative     Bilirubin, UA Negative     Blood, UA Negative     Protein, UA Negative     Leuk Esterase, UA Small (1+)     Nitrite, UA Negative     Urobilinogen, UA 0.2 E.U./dL    Lactic Acid, Plasma [[id number]]  (Normal) Collected: 11/08/24 1102    Specimen: Blood Updated: 11/08/24 1131     Lactate 0.9 mmol/L     CBC Auto Differential [[id number]]  (Normal) Collected: 11/08/24 1102    Specimen: Blood Updated: 11/08/24 1114     WBC 7.77 10*3/mm3      RBC 4.62 10*6/mm3      Hemoglobin 12.9 g/dL      Hematocrit 39.4 %      MCV 85.3 fL      MCH 27.9 pg      MCHC 32.7 g/dL      RDW 12.6 %      RDW-SD 39.0 fl      MPV 10.7 fL      Platelets 284 10*3/mm3      Neutrophil % 60.7 %      Lymphocyte % 29.1 %      Monocyte % 6.6 %      Eosinophil % 2.7 %      Basophil % 0.6 %      Immature Grans % 0.3 %      Neutrophils, Absolute 4.72 10*3/mm3      Lymphocytes, Absolute 2.26 10*3/mm3      Monocytes, Absolute 0.51 10*3/mm3      Eosinophils, Absolute 0.21 10*3/mm3      Basophils, Absolute 0.05 10*3/mm3      Immature Grans, Absolute 0.02 10*3/mm3      nRBC 0.0 /100 WBC     Urinalysis, Microscopic Only - Urine, Clean Catch [[id number]]  (Abnormal) Collected: 11/08/24 1102    Specimen: Urine, Clean Catch Updated: 11/08/24 1133     RBC, UA None Seen /HPF      WBC, UA 0-2 /HPF      Bacteria, UA None Seen /HPF      Squamous Epithelial Cells, UA 3-6 /HPF      Hyaline Casts, UA None Seen /LPF      Methodology Manual Light Microscopy             Imaging:    No Radiology Exams Resulted Within Past 24 Hours      Differential Diagnosis and Discussion:    Diarrhea: Differential diagnosis includes but is not limited to malabsorption syndrome, bacterial infection, carcinoid syndrome, pancreatic hypersecretion, viral infection, celiac sprue, Crohn's disease, ulcerative colitis,  ischemic colitis, colitis, hypermotility, and irritable bowel syndrome.  Vomiting: Differential diagnosis includes but is not limited to migraine, labyrinthine disorders, psychogenic, metabolic and endocrine causes, peptic ulcer, gastric outlet obstruction, gastritis, gastroenteritis, appendicitis, intestinal obstruction, paralytic ileus, food poisoning, cholecystitis, acute hepatitis, acute pancreatitis, acute febrile illness, and myocardial infarction.    All labs were reviewed and interpreted by me.    MDM           Patient Care Considerations:    SEPSIS was considered but is NOT present in the emergency department as SIRS criteria is not present.      Consultants/Shared Management Plan:    None    Social Determinants of Health:    Patient is independent, reliable, and has access to care.       Disposition and Care Coordination:    Discharged: I considered escalation of care by admitting this patient to the hospital, however there were no acute findings to warrant inpatient admission.    I have explained the patient´s condition, diagnoses and treatment plan based on the information available to me at this time. I have answered questions and addressed any concerns. The patient has a good  understanding of the patient´s diagnosis, condition, and treatment plan as can be expected at this point. The vital signs have been stable. The patient´s condition is stable and appropriate for discharge from the emergency department.      The patient will pursue further outpatient evaluation with the primary care physician or other designated or consulting physician as outlined in the discharge instructions. They are agreeable to this plan of care and follow-up instructions have been explained in detail. The patient has received these instructions in written format and has expressed an understanding of the discharge instructions. The patient is aware that any significant change in condition or worsening of symptoms should prompt  an immediate return to this or the closest emergency department or call to 911.    Final diagnoses:   Diarrhea, unspecified type        ED Disposition       ED Disposition   Discharge    Condition   Stable    Comment   --               This medical record created using voice recognition software.             Devin Giron DO  11/10/24 1950

## 2024-11-08 NOTE — DISCHARGE INSTRUCTIONS
Rest at home.  Drink plenty fluids.  Sure adequate hydration.  Continue your home medications as prescribed.  Establish primary care and arrange for outpatient follow-up.  Return to the ER for any change or worsening abdominal pain, fever greater than 101, rectal bleeding, or any other concerns issues that may arise.

## 2024-11-09 LAB
BACTERIA UR QL AUTO: ABNORMAL /HPF
BILIRUB UR QL STRIP: NEGATIVE
CLARITY UR: CLEAR
COLOR UR: YELLOW
GLUCOSE UR STRIP-MCNC: ABNORMAL MG/DL
HGB UR QL STRIP.AUTO: NEGATIVE
HYALINE CASTS UR QL AUTO: ABNORMAL /LPF
KETONES UR QL STRIP: NEGATIVE
LEUKOCYTE ESTERASE UR QL STRIP.AUTO: ABNORMAL
NITRITE UR QL STRIP: NEGATIVE
PH UR STRIP.AUTO: 5.5 [PH] (ref 5–8)
PROT UR QL STRIP: ABNORMAL
RBC # UR STRIP: ABNORMAL /HPF
REF LAB TEST METHOD: ABNORMAL
SP GR UR STRIP: 1.01 (ref 1–1.03)
SQUAMOUS #/AREA URNS HPF: ABNORMAL /HPF
UROBILINOGEN UR QL STRIP: ABNORMAL
WBC # UR STRIP: ABNORMAL /HPF

## 2024-11-09 NOTE — ED PROVIDER NOTES
Time: 10:33 PM EST  Date of encounter:  11/8/2024  Independent Historian/Clinical History and Information was obtained by:   Patient    History is limited by: N/A    Chief Complaint: Nausea      History of Present Illness:  Patient is a 54 y.o. year old female who presents to the emergency department for evaluation of nausea, diarrhea, vomiting, muscle ache that started earlier today.  Patient denies chest pain and shortness of breath.  Patient does report nasal congestion.  Patient denies cough.  Patient has no hemoptysis.  Patient denies dysuria and urinary frequency.      Patient Care Team  Primary Care Provider: Provider, Jania Known    Past Medical History:     Allergies   Allergen Reactions    Benadryl [Diphenhydramine] Hives     History reviewed. No pertinent past medical history.  Past Surgical History:   Procedure Laterality Date    ABDOMINAL WALL ABSCESS INCISION AND DRAINAGE      CHOLECYSTECTOMY       Family History   Problem Relation Age of Onset    Cancer Mother     Alcohol abuse Mother        Home Medications:  Prior to Admission medications    Medication Sig Start Date End Date Taking? Authorizing Provider   acetaminophen (TYLENOL) 325 MG tablet Take 2 tablets by mouth Every 4 (Four) Hours As Needed for Mild Pain.    Hosea Preston MD   diclofenac (VOLTAREN) 75 MG EC tablet Take 1 tablet by mouth 2 (Two) Times a Day As Needed (Pain). 10/9/24   Devin Giron DO   fluconazole (DIFLUCAN) 150 MG tablet Take 1 tablet by mouth 1 (One) Time. 9/10/24   Hosea Preston MD        Social History:   Social History     Tobacco Use    Smoking status: Every Day     Current packs/day: 1.00     Average packs/day: 1 pack/day for 42.9 years (42.9 ttl pk-yrs)     Types: Cigarettes     Start date: 1982    Smokeless tobacco: Never   Vaping Use    Vaping status: Never Used   Substance Use Topics    Alcohol use: Never    Drug use: Never         Review of Systems:  Review of Systems   Constitutional:  Negative for  "chills and fever.   HENT:  Negative for congestion, rhinorrhea and sore throat.    Eyes:  Negative for pain and visual disturbance.   Respiratory:  Negative for apnea, cough, chest tightness and shortness of breath.    Cardiovascular:  Negative for chest pain and palpitations.   Gastrointestinal:  Positive for nausea and vomiting. Negative for abdominal pain and diarrhea.   Genitourinary:  Negative for difficulty urinating and dysuria.   Musculoskeletal:  Negative for joint swelling and myalgias.   Skin:  Negative for color change.   Neurological:  Negative for seizures and headaches.   Psychiatric/Behavioral: Negative.     All other systems reviewed and are negative.       Physical Exam:  /77 (Patient Position: Sitting)   Pulse 87   Temp 98.1 °F (36.7 °C) (Oral)   Resp 17   Ht 167.6 cm (66\")   Wt 82.9 kg (182 lb 12.2 oz)   LMP  (LMP Unknown)   SpO2 96%   BMI 29.50 kg/m²     Physical Exam  Vitals and nursing note reviewed.   Constitutional:       General: She is not in acute distress.     Appearance: Normal appearance. She is not toxic-appearing.   HENT:      Head: Normocephalic and atraumatic.      Jaw: There is normal jaw occlusion.   Eyes:      General: Lids are normal.      Extraocular Movements: Extraocular movements intact.      Conjunctiva/sclera: Conjunctivae normal.      Pupils: Pupils are equal, round, and reactive to light.   Cardiovascular:      Rate and Rhythm: Normal rate and regular rhythm.      Pulses: Normal pulses.      Heart sounds: Normal heart sounds.   Pulmonary:      Effort: Pulmonary effort is normal. No respiratory distress.      Breath sounds: Normal breath sounds. No wheezing or rhonchi.   Abdominal:      General: Abdomen is flat.      Palpations: Abdomen is soft.      Tenderness: There is no abdominal tenderness. There is no guarding or rebound.   Musculoskeletal:         General: Normal range of motion.      Cervical back: Normal range of motion and neck supple.      Right " lower leg: No edema.      Left lower leg: No edema.   Skin:     General: Skin is warm and dry.   Neurological:      Mental Status: She is alert and oriented to person, place, and time. Mental status is at baseline.   Psychiatric:         Mood and Affect: Mood normal.                  Procedures:  Procedures      Medical Decision Making:      Comorbidities that affect care:    None    External Notes reviewed:    Previous ED Note: Patient was last seen in the ED for right leg numbness      The following orders were placed and all results were independently analyzed by me:  Orders Placed This Encounter   Procedures    COVID PRE-OP / PRE-PROCEDURE SCREENING ORDER (NO ISOLATION) - Swab, Nasopharynx    COVID-19, FLU A/B, RSV PCR 1 HR TAT - Swab, Nasopharynx    Burket Draw    Comprehensive Metabolic Panel    Lipase    Urinalysis With Microscopic If Indicated (No Culture) - Urine, Clean Catch    Lactic Acid, Plasma    CBC Auto Differential    NPO Diet NPO Type: Strict NPO    Undress & Gown    Insert Peripheral IV    CBC & Differential    Green Top (Gel)    Lavender Top    Gold Top - SST    Light Blue Top       Medications Given in the Emergency Department:  Medications   sodium chloride 0.9 % flush 10 mL (has no administration in time range)   sodium chloride 0.9 % bolus 1,000 mL (1,000 mL Intravenous New Bag 11/8/24 2255)   ondansetron (ZOFRAN) injection 4 mg (4 mg Intravenous Given 11/8/24 2254)   ketorolac (TORADOL) injection 30 mg (30 mg Intravenous Given 11/8/24 2254)   famotidine (PEPCID) injection 20 mg (20 mg Intravenous Given 11/8/24 2254)        ED Course:         Labs:    Lab Results (last 24 hours)       Procedure Component Value Units Date/Time    POC Glucose Once [[id number]]  (Abnormal) Collected: 11/08/24 1009    Specimen: Blood Updated: 11/08/24 1011     Glucose 147 mg/dL      Comment: Serial Number: [id number]:  [id number]       CBC & Differential [[id number]]  (Normal) Collected: 11/08/24 1102     Specimen: Blood Updated: 11/08/24 1114    Narrative:      The following orders were created for panel order CBC & Differential.  Procedure                               Abnormality         Status                     ---------                               -----------         ------                     CBC Auto Differential[[id number]]        Normal              Final result                 Please view results for these tests on the individual orders.    Comprehensive Metabolic Panel [[id number]]  (Abnormal) Collected: 11/08/24 1102    Specimen: Blood Updated: 11/08/24 1135     Glucose 168 mg/dL      BUN 17 mg/dL      Creatinine 0.55 mg/dL      Sodium 137 mmol/L      Potassium 4.1 mmol/L      Comment: Slight hemolysis detected by analyzer. Result may be falsely elevated.        Chloride 105 mmol/L      CO2 22.5 mmol/L      Calcium 9.6 mg/dL      Total Protein 7.3 g/dL      Albumin 3.8 g/dL      ALT (SGPT) 12 U/L      AST (SGOT) 12 U/L      Alkaline Phosphatase 93 U/L      Total Bilirubin 0.2 mg/dL      Globulin 3.5 gm/dL      A/G Ratio 1.1 g/dL      BUN/Creatinine Ratio 30.9     Anion Gap 9.5 mmol/L      eGFR 107.7 mL/min/1.73     Narrative:      GFR Normal >60  Chronic Kidney Disease <60  Kidney Failure <15      Lipase [[id number]]  (Normal) Collected: 11/08/24 1102    Specimen: Blood Updated: 11/08/24 1135     Lipase 32 U/L     Urinalysis With Microscopic If Indicated (No Culture) - Urine, Clean Catch [[id number]]  (Abnormal) Collected: 11/08/24 1102    Specimen: Urine, Clean Catch Updated: 11/08/24 1116     Color, UA Yellow     Appearance, UA Clear     pH, UA 6.5     Specific Gravity, UA <=1.005     Glucose, UA Negative     Ketones, UA Negative     Bilirubin, UA Negative     Blood, UA Negative     Protein, UA Negative     Leuk Esterase, UA Small (1+)     Nitrite, UA Negative     Urobilinogen, UA 0.2 E.U./dL    Lactic Acid, Plasma [[id number]]  (Normal) Collected: 11/08/24 1102    Specimen: Blood Updated: 11/08/24 1131      Lactate 0.9 mmol/L     CBC Auto Differential [[id number]]  (Normal) Collected: 11/08/24 1102    Specimen: Blood Updated: 11/08/24 1114     WBC 7.77 10*3/mm3      RBC 4.62 10*6/mm3      Hemoglobin 12.9 g/dL      Hematocrit 39.4 %      MCV 85.3 fL      MCH 27.9 pg      MCHC 32.7 g/dL      RDW 12.6 %      RDW-SD 39.0 fl      MPV 10.7 fL      Platelets 284 10*3/mm3      Neutrophil % 60.7 %      Lymphocyte % 29.1 %      Monocyte % 6.6 %      Eosinophil % 2.7 %      Basophil % 0.6 %      Immature Grans % 0.3 %      Neutrophils, Absolute 4.72 10*3/mm3      Lymphocytes, Absolute 2.26 10*3/mm3      Monocytes, Absolute 0.51 10*3/mm3      Eosinophils, Absolute 0.21 10*3/mm3      Basophils, Absolute 0.05 10*3/mm3      Immature Grans, Absolute 0.02 10*3/mm3      nRBC 0.0 /100 WBC     Urinalysis, Microscopic Only - Urine, Clean Catch [[id number]]  (Abnormal) Collected: 11/08/24 1102    Specimen: Urine, Clean Catch Updated: 11/08/24 1133     RBC, UA None Seen /HPF      WBC, UA 0-2 /HPF      Bacteria, UA None Seen /HPF      Squamous Epithelial Cells, UA 3-6 /HPF      Hyaline Casts, UA None Seen /LPF      Methodology Manual Light Microscopy    CBC & Differential [[id number]]  (Normal) Collected: 11/08/24 2225    Specimen: Blood Updated: 11/08/24 2231    Narrative:      The following orders were created for panel order CBC & Differential.  Procedure                               Abnormality         Status                     ---------                               -----------         ------                     CBC Auto Differential[[id number]]        Normal              Final result                 Please view results for these tests on the individual orders.    Comprehensive Metabolic Panel [[id number]]  (Abnormal) Collected: 11/08/24 2225    Specimen: Blood Updated: 11/08/24 2247     Glucose 206 mg/dL      BUN 17 mg/dL      Creatinine 0.78 mg/dL      Sodium 137 mmol/L      Potassium 4.0 mmol/L      Chloride 104 mmol/L      CO2 22.5  mmol/L      Calcium 9.3 mg/dL      Total Protein 7.3 g/dL      Albumin 3.9 g/dL      ALT (SGPT) 12 U/L      AST (SGOT) 11 U/L      Alkaline Phosphatase 94 U/L      Total Bilirubin 0.2 mg/dL      Globulin 3.4 gm/dL      A/G Ratio 1.1 g/dL      BUN/Creatinine Ratio 21.8     Anion Gap 10.5 mmol/L      eGFR 89.3 mL/min/1.73     Narrative:      GFR Normal >60  Chronic Kidney Disease <60  Kidney Failure <15      Lipase [[id number]]  (Normal) Collected: 11/08/24 2225    Specimen: Blood Updated: 11/08/24 2247     Lipase 38 U/L     Lactic Acid, Plasma [[id number]]  (Normal) Collected: 11/08/24 2225    Specimen: Blood Updated: 11/08/24 2244     Lactate 1.5 mmol/L     CBC Auto Differential [[id number]]  (Normal) Collected: 11/08/24 2225    Specimen: Blood Updated: 11/08/24 2231     WBC 9.86 10*3/mm3      RBC 4.54 10*6/mm3      Hemoglobin 12.9 g/dL      Hematocrit 38.9 %      MCV 85.7 fL      MCH 28.4 pg      MCHC 33.2 g/dL      RDW 12.8 %      RDW-SD 39.8 fl      MPV 10.6 fL      Platelets 296 10*3/mm3      Neutrophil % 63.0 %      Lymphocyte % 27.7 %      Monocyte % 6.4 %      Eosinophil % 2.1 %      Basophil % 0.6 %      Immature Grans % 0.2 %      Neutrophils, Absolute 6.21 10*3/mm3      Lymphocytes, Absolute 2.73 10*3/mm3      Monocytes, Absolute 0.63 10*3/mm3      Eosinophils, Absolute 0.21 10*3/mm3      Basophils, Absolute 0.06 10*3/mm3      Immature Grans, Absolute 0.02 10*3/mm3      nRBC 0.0 /100 WBC              Imaging:    No Radiology Exams Resulted Within Past 24 Hours      Differential Diagnosis and Discussion:    Diarrhea: Differential diagnosis includes but is not limited to malabsorption syndrome, bacterial infection, carcinoid syndrome, pancreatic hypersecretion, viral infection, celiac sprue, Crohn's disease, ulcerative colitis, ischemic colitis, colitis, hypermotility, and irritable bowel syndrome.    All labs were reviewed and interpreted by me.    MDM       The patient presents with vomiting and diarrhea  consistent with gastroenteritis.  The patient´s CBC that was reviewed and interpreted by me shows no abnormalities of critical concern. Of note, there is no anemia requiring a blood transfusion and the platelet count is acceptable.  The patient´s CMP that was reviewed and interpretted by me shows no abnormalities of critical concern. Of note, the patient´s sodium and potassium are acceptable. The patient´s liver enzymes are unremarkable. The patient´s renal function (creatinine) is preserved. The patient has a normal anion gap.  The patient has a soft and benign abdominal exam. The patient is now resting comfortably and feels better, is alert, and is in no distress. The patient is able to tolerate po intake in the ED. The patient's labs were reviewed and hydration status was assessed. The patient has no signs of acute renal failure, sepsis, or dehydration that warrants admission to the hospital. The vital signs have been stable. The patient's condition is stable and appropriate for discharge. The patient will pursue further outpatient evaluation with the primary care physician or designated physician. The patient and/or caregivers have expressed a clear and thorough understanding and agreed to follow-up as instructed.              Patient Care Considerations:    ANTIBIOTICS: I considered prescribing antibiotics as an outpatient however no bacterial focus of infection was found.      Consultants/Shared Management Plan:    None    Social Determinants of Health:    Patient is independent, reliable, and has access to care.       Disposition and Care Coordination:    Discharged: I considered escalation of care by admitting this patient to the hospital, however patient has no renal failure and has had no return of emesis in the emergency department.    I have explained the patient´s condition, diagnoses and treatment plan based on the information available to me at this time. I have answered questions and addressed any  concerns. The patient has a good  understanding of the patient´s diagnosis, condition, and treatment plan as can be expected at this point. The vital signs have been stable. The patient´s condition is stable and appropriate for discharge from the emergency department.      The patient will pursue further outpatient evaluation with the primary care physician or other designated or consulting physician as outlined in the discharge instructions. They are agreeable to this plan of care and follow-up instructions have been explained in detail. The patient has received these instructions in written format and has expressed an understanding of the discharge instructions. The patient is aware that any significant change in condition or worsening of symptoms should prompt an immediate return to this or the closest emergency department or call to 1.  I have explained discharge medications and the need for follow up with the patient/caretakers. This was also printed in the discharge instructions. Patient was discharged with the following medications and follow up:      Medication List        New Prescriptions      ondansetron ODT 4 MG disintegrating tablet  Commonly known as: ZOFRAN-ODT  Place 1 tablet on the tongue Every 8 (Eight) Hours As Needed for Nausea or Vomiting.               Where to Get Your Medications        These medications were sent to [facility] [address] [place] [address] [phone]  - [phone] [address] [address]      Phone: [phone]   ondansetron ODT 4 MG disintegrating tablet      Provider, No Known  [facility] [id number] [phone]    In 2 days         Final diagnoses:   Diarrhea, unspecified type        ED Disposition       ED Disposition   Discharge    Condition   Stable    Comment   --               This medical record created using voice recognition software.             Marco Antonio Gan MD  11/08/24  [id number]

## 2025-01-14 PROCEDURE — 93005 ELECTROCARDIOGRAM TRACING: CPT | Performed by: EMERGENCY MEDICINE

## 2025-01-14 PROCEDURE — 85379 FIBRIN DEGRADATION QUANT: CPT | Performed by: EMERGENCY MEDICINE

## 2025-01-14 PROCEDURE — 99284 EMERGENCY DEPT VISIT MOD MDM: CPT

## 2025-01-14 PROCEDURE — 80053 COMPREHEN METABOLIC PANEL: CPT | Performed by: EMERGENCY MEDICINE

## 2025-01-14 PROCEDURE — 85025 COMPLETE CBC W/AUTO DIFF WBC: CPT | Performed by: EMERGENCY MEDICINE

## 2025-01-14 PROCEDURE — 36415 COLL VENOUS BLD VENIPUNCTURE: CPT | Performed by: EMERGENCY MEDICINE

## 2025-01-14 PROCEDURE — 83880 ASSAY OF NATRIURETIC PEPTIDE: CPT | Performed by: EMERGENCY MEDICINE

## 2025-01-14 PROCEDURE — 84484 ASSAY OF TROPONIN QUANT: CPT | Performed by: EMERGENCY MEDICINE

## 2025-01-14 PROCEDURE — 83690 ASSAY OF LIPASE: CPT | Performed by: EMERGENCY MEDICINE

## 2025-01-14 PROCEDURE — 83735 ASSAY OF MAGNESIUM: CPT | Performed by: EMERGENCY MEDICINE

## 2025-01-14 RX ORDER — ASPIRIN 81 MG/1
324 TABLET, CHEWABLE ORAL ONCE
Status: DISCONTINUED | OUTPATIENT
Start: 2025-01-15 | End: 2025-01-15 | Stop reason: HOSPADM

## 2025-01-14 RX ORDER — SODIUM CHLORIDE 0.9 % (FLUSH) 0.9 %
10 SYRINGE (ML) INJECTION AS NEEDED
Status: DISCONTINUED | OUTPATIENT
Start: 2025-01-14 | End: 2025-01-15 | Stop reason: HOSPADM

## 2025-01-15 ENCOUNTER — APPOINTMENT (OUTPATIENT)
Dept: GENERAL RADIOLOGY | Facility: HOSPITAL | Age: 55
End: 2025-01-15
Payer: COMMERCIAL

## 2025-01-15 ENCOUNTER — HOSPITAL ENCOUNTER (EMERGENCY)
Facility: HOSPITAL | Age: 55
Discharge: HOME | End: 2025-01-15
Attending: EMERGENCY MEDICINE
Payer: COMMERCIAL

## 2025-01-15 VITALS
HEART RATE: 89 BPM | TEMPERATURE: 97.9 F | RESPIRATION RATE: 15 BRPM | BODY MASS INDEX: 31.32 KG/M2 | SYSTOLIC BLOOD PRESSURE: 140 MMHG | HEIGHT: 66 IN | DIASTOLIC BLOOD PRESSURE: 65 MMHG | OXYGEN SATURATION: 96 % | WEIGHT: 194.89 LBS

## 2025-01-15 DIAGNOSIS — R07.9 CHEST PAIN, UNSPECIFIED TYPE: Primary | ICD-10-CM

## 2025-01-15 LAB
ALBUMIN SERPL-MCNC: 4 G/DL (ref 3.5–5.2)
ALBUMIN/GLOB SERPL: 1.1 G/DL
ALP SERPL-CCNC: 105 U/L (ref 39–117)
ALT SERPL W P-5'-P-CCNC: 14 U/L (ref 1–33)
ANION GAP SERPL CALCULATED.3IONS-SCNC: 13 MMOL/L (ref 5–15)
AST SERPL-CCNC: 12 U/L (ref 1–32)
BASOPHILS # BLD AUTO: 0.08 10*3/MM3 (ref 0–0.2)
BASOPHILS NFR BLD AUTO: 0.8 % (ref 0–1.5)
BILIRUB SERPL-MCNC: 0.2 MG/DL (ref 0–1.2)
BUN SERPL-MCNC: 21 MG/DL (ref 6–20)
BUN/CREAT SERPL: 30.4 (ref 7–25)
CALCIUM SPEC-SCNC: 9.3 MG/DL (ref 8.6–10.5)
CHLORIDE SERPL-SCNC: 99 MMOL/L (ref 98–107)
CO2 SERPL-SCNC: 24 MMOL/L (ref 22–29)
CREAT SERPL-MCNC: 0.69 MG/DL (ref 0.57–1)
D DIMER PPP FEU-MCNC: 0.53 MCGFEU/ML (ref 0–0.56)
DEPRECATED RDW RBC AUTO: 40.9 FL (ref 37–54)
EGFRCR SERPLBLD CKD-EPI 2021: 102 ML/MIN/1.73
EOSINOPHIL # BLD AUTO: 0.18 10*3/MM3 (ref 0–0.4)
EOSINOPHIL NFR BLD AUTO: 1.9 % (ref 0.3–6.2)
ERYTHROCYTE [DISTWIDTH] IN BLOOD BY AUTOMATED COUNT: 12.7 % (ref 12.3–15.4)
GEN 5 1HR TROPONIN T REFLEX: 9 NG/L
GLOBULIN UR ELPH-MCNC: 3.8 GM/DL
GLUCOSE SERPL-MCNC: 180 MG/DL (ref 65–99)
HCT VFR BLD AUTO: 39.4 % (ref 34–46.6)
HGB BLD-MCNC: 12.8 G/DL (ref 12–15.9)
HOLD SPECIMEN: NORMAL
HOLD SPECIMEN: NORMAL
IMM GRANULOCYTES # BLD AUTO: 0.04 10*3/MM3 (ref 0–0.05)
IMM GRANULOCYTES NFR BLD AUTO: 0.4 % (ref 0–0.5)
LIPASE SERPL-CCNC: 55 U/L (ref 13–60)
LYMPHOCYTES # BLD AUTO: 2.19 10*3/MM3 (ref 0.7–3.1)
LYMPHOCYTES NFR BLD AUTO: 23.1 % (ref 19.6–45.3)
MAGNESIUM SERPL-MCNC: 1.9 MG/DL (ref 1.6–2.6)
MCH RBC QN AUTO: 28.4 PG (ref 26.6–33)
MCHC RBC AUTO-ENTMCNC: 32.5 G/DL (ref 31.5–35.7)
MCV RBC AUTO: 87.4 FL (ref 79–97)
MONOCYTES # BLD AUTO: 0.58 10*3/MM3 (ref 0.1–0.9)
MONOCYTES NFR BLD AUTO: 6.1 % (ref 5–12)
NEUTROPHILS NFR BLD AUTO: 6.42 10*3/MM3 (ref 1.7–7)
NEUTROPHILS NFR BLD AUTO: 67.7 % (ref 42.7–76)
NRBC BLD AUTO-RTO: 0 /100 WBC (ref 0–0.2)
NT-PROBNP SERPL-MCNC: 274.7 PG/ML (ref 0–900)
PLATELET # BLD AUTO: 318 10*3/MM3 (ref 140–450)
PMV BLD AUTO: 10.4 FL (ref 6–12)
POTASSIUM SERPL-SCNC: 4.3 MMOL/L (ref 3.5–5.2)
PROT SERPL-MCNC: 7.8 G/DL (ref 6–8.5)
RBC # BLD AUTO: 4.51 10*6/MM3 (ref 3.77–5.28)
SODIUM SERPL-SCNC: 136 MMOL/L (ref 136–145)
TROPONIN T NUMERIC DELTA: 1 NG/L
TROPONIN T SERPL HS-MCNC: 8 NG/L
WBC NRBC COR # BLD AUTO: 9.49 10*3/MM3 (ref 3.4–10.8)
WHOLE BLOOD HOLD COAG: NORMAL
WHOLE BLOOD HOLD SPECIMEN: NORMAL

## 2025-01-15 PROCEDURE — 71045 X-RAY EXAM CHEST 1 VIEW: CPT

## 2025-01-15 PROCEDURE — 84484 ASSAY OF TROPONIN QUANT: CPT | Performed by: EMERGENCY MEDICINE

## 2025-01-15 PROCEDURE — 93010 ELECTROCARDIOGRAM REPORT: CPT | Performed by: INTERNAL MEDICINE

## 2025-01-15 NOTE — ED PROVIDER NOTES
Time: 11:52 PM EST  Date of encounter:  1/14/2025  Independent Historian/Clinical History and Information was obtained by:   Patient    History is limited by: N/A    Chief Complaint: Chest pain      History of Present Illness:  Patient is a 54 y.o. year old female who presents to the emergency department for evaluation of sided chest pain radiating left arm down her side down left leg and in her groin area.  Denies fall or injury.  Denies cardiac history.  Scheduled to see a cardiologist.    Chest pain starting in the center radiating around her left ribs and left arm.  She also reports some pain in her left leg.  Pain has been present for 9 days.  No alleviating or aggravating factors.  Pain is intermittent.  No known history of heart or lung disease.  No cough or congestion, no fever or chills.  No other complaints.    Patient Care Team  Primary Care Provider: Nathalie Dolan MD    Past Medical History:     Allergies   Allergen Reactions    Benadryl [Diphenhydramine] Hives     No past medical history on file.  Past Surgical History:   Procedure Laterality Date    ABDOMINAL WALL ABSCESS INCISION AND DRAINAGE      CHOLECYSTECTOMY       Family History   Problem Relation Age of Onset    Cancer Mother     Alcohol abuse Mother        Home Medications:  Prior to Admission medications    Medication Sig Start Date End Date Taking? Authorizing Provider   acetaminophen (TYLENOL) 325 MG tablet Take 2 tablets by mouth Every 4 (Four) Hours As Needed for Mild Pain.    Hosea Preston MD   diclofenac (VOLTAREN) 75 MG EC tablet Take 1 tablet by mouth 2 (Two) Times a Day As Needed (Pain). 10/9/24   Devin Giron DO   fluconazole (DIFLUCAN) 150 MG tablet Take 1 tablet by mouth 1 (One) Time. 9/10/24   Hosea Preston MD   ondansetron ODT (ZOFRAN-ODT) 4 MG disintegrating tablet Place 1 tablet on the tongue Every 8 (Eight) Hours As Needed for Nausea or Vomiting. 11/8/24   Marco Antonio Gan MD        Social History:   Social  "History     Tobacco Use    Smoking status: Every Day     Current packs/day: 1.00     Average packs/day: 1 pack/day for 43.0 years (43.0 ttl pk-yrs)     Types: Cigarettes     Start date: 1982    Smokeless tobacco: Never   Vaping Use    Vaping status: Never Used   Substance Use Topics    Alcohol use: Never    Drug use: Never         Review of Systems:  Review of Systems   Constitutional:  Negative for chills and fever.   HENT:  Negative for congestion, ear pain and sore throat.    Eyes:  Negative for pain.   Respiratory:  Negative for cough, chest tightness and shortness of breath.    Cardiovascular:  Positive for chest pain.   Gastrointestinal:  Negative for abdominal pain, diarrhea, nausea and vomiting.   Genitourinary:  Negative for flank pain and hematuria.   Musculoskeletal:  Positive for myalgias. Negative for joint swelling.   Skin:  Negative for pallor.   Neurological:  Negative for seizures and headaches.   All other systems reviewed and are negative.       Physical Exam:  /65   Pulse 89   Temp 97.9 °F (36.6 °C) (Oral)   Resp 15   Ht 167.6 cm (66\")   Wt 88.4 kg (194 lb 14.2 oz)   LMP  (LMP Unknown)   SpO2 96%   BMI 31.46 kg/m²     Physical Exam  Constitutional:       Appearance: Normal appearance.   HENT:      Head: Normocephalic and atraumatic.      Nose: Nose normal.      Mouth/Throat:      Mouth: Mucous membranes are moist.   Eyes:      Extraocular Movements: Extraocular movements intact.      Conjunctiva/sclera: Conjunctivae normal.      Pupils: Pupils are equal, round, and reactive to light.   Cardiovascular:      Rate and Rhythm: Normal rate and regular rhythm.      Pulses: Normal pulses.      Heart sounds: Normal heart sounds.   Pulmonary:      Effort: Pulmonary effort is normal.      Breath sounds: Normal breath sounds.   Chest:      Chest wall: Tenderness present.   Abdominal:      General: There is no distension.      Palpations: Abdomen is soft.      Tenderness: There is no abdominal " tenderness.   Musculoskeletal:         General: Normal range of motion.      Cervical back: Normal range of motion and neck supple.   Skin:     General: Skin is warm and dry.      Capillary Refill: Capillary refill takes less than 2 seconds.   Neurological:      General: No focal deficit present.      Mental Status: She is alert and oriented to person, place, and time. Mental status is at baseline.   Psychiatric:         Mood and Affect: Mood normal.         Behavior: Behavior normal.                    Medical Decision Making:      Comorbidities that affect care:    Smoking    External Notes reviewed:    Hospital Discharge Summary: Patient was admitted at [facility] for chest pain.  She was admitted on 11/23/2024 and discharged on 11/23/2024 for chest pain.  Plan was for follow-up with cardiology for stress test.  At that visit she had a normal ECG, 3 negative troponins and CTA negative for PE.      The following orders were placed and all results were independently analyzed by me:  Orders Placed This Encounter   Procedures    XR Chest 1 View    [place] Draw    High Sensitivity Troponin T    Comprehensive Metabolic Panel    Lipase    BNP    Magnesium    CBC Auto Differential    High Sensitivity Troponin T 1Hr    D-dimer, Quantitative    Ambulatory Referral to Cardiology    NPO Diet NPO Type: Strict NPO    Undress & Gown    Continuous Pulse Oximetry    Oxygen Therapy- Nasal Cannula; Titrate 1-6 LPM Per SpO2; 90 - 95%    ECG 12 Lead ED Triage Standing Order; Chest Pain    ECG 12 Lead ED Triage Standing Order; Chest Pain    Insert Peripheral IV    CBC & Differential    Green Top (Gel)    Lavender Top    Gold Top - SST    Light Blue Top       Medications Given in the Emergency Department:  Medications   sodium chloride 0.9 % flush 10 mL (has no administration in time range)   aspirin chewable tablet 324 mg (324 mg Oral Not Given 1/15/25 0224)        ED Course:    ED Course as of 01/15/25 0239   Tue Jan 14, 2025    2352 --- PROVIDER IN TRIAGE NOTE ---    The patient was evaluated by tSanley gold in triage. Orders were placed and the patient is currently awaiting disposition.    [AJ]   Wed Guillermo 15, 2025   0239 ECG 12 Lead ED Triage Standing Order; Chest Pain  Normal sinus rhythm with rate of 82. QRS normal. UT interval normal. QTc interval is normal. No ST elevation or depression. No T wave abnormalities. This EKG was interpreted by me.  [LD]      ED Course User Index  [AJ] Stanley Wilson PA-C  [LD] Anabelle De La Torre MD       Labs:    Lab Results (last 24 hours)       Procedure Component Value Units Date/Time    High Sensitivity Troponin T [[id number]]  (Normal) Collected: 01/14/25 2358    Specimen: Blood Updated: 01/15/25 0029     HS Troponin T 8 ng/L     Narrative:      High Sensitive Troponin T Reference Range:  <14.0 ng/L- Negative Female for AMI  <22.0 ng/L- Negative Male for AMI  >=14 - Abnormal Female indicating possible myocardial injury.  >=22 - Abnormal Male indicating possible myocardial injury.   Clinicians would have to utilize clinical acumen, EKG, Troponin, and serial changes to determine if it is an Acute Myocardial Infarction or myocardial injury due to an underlying chronic condition.         CBC & Differential [[id number]]  (Normal) Collected: 01/14/25 2358    Specimen: Blood Updated: 01/15/25 0012    Narrative:      The following orders were created for panel order CBC & Differential.  Procedure                               Abnormality         Status                     ---------                               -----------         ------                     CBC Auto Differential[[id number]]        Normal              Final result                 Please view results for these tests on the individual orders.    Comprehensive Metabolic Panel [[id number]]  (Abnormal) Collected: 01/14/25 2358    Specimen: Blood Updated: 01/15/25 0029     Glucose 180 mg/dL      BUN 21 mg/dL      Creatinine 0.69 mg/dL       Sodium 136 mmol/L      Potassium 4.3 mmol/L      Comment: Slight hemolysis detected by analyzer. Result may be falsely elevated.        Chloride 99 mmol/L      CO2 24.0 mmol/L      Calcium 9.3 mg/dL      Total Protein 7.8 g/dL      Albumin 4.0 g/dL      ALT (SGPT) 14 U/L      AST (SGOT) 12 U/L      Alkaline Phosphatase 105 U/L      Total Bilirubin 0.2 mg/dL      Globulin 3.8 gm/dL      A/G Ratio 1.1 g/dL      BUN/Creatinine Ratio 30.4     Anion Gap 13.0 mmol/L      eGFR 102.0 mL/min/1.73     Narrative:      GFR Categories in Chronic Kidney Disease (CKD)      GFR Category          GFR (mL/min/1.73)    Interpretation  G1                     90 or greater         Normal or high (1)  G2                      60-89                Mild decrease (1)  G3a                   45-59                Mild to moderate decrease  G3b                   30-44                Moderate to severe decrease  G4                    15-29                Severe decrease  G5                    14 or less           Kidney failure          (1)In the absence of evidence of kidney disease, neither GFR category G1 or G2 fulfill the criteria for CKD.    eGFR calculation 2021 CKD-EPI creatinine equation, which does not include race as a factor    Lipase [[id number]]  (Normal) Collected: 01/14/25 2358    Specimen: Blood Updated: 01/15/25 0029     Lipase 55 U/L     BNP [[id number]]  (Normal) Collected: 01/14/25 2358    Specimen: Blood Updated: 01/15/25 0027     proBNP 274.7 pg/mL     Narrative:      This assay is used as an aid in the diagnosis of individuals suspected of having heart failure. It can be used as an aid in the diagnosis of acute decompensated heart failure (ADHF) in patients presenting with signs and symptoms of ADHF to the emergency department (ED). In addition, NT-proBNP of <300 pg/mL indicates ADHF is not likely.    Age Range Result Interpretation  NT-proBNP Concentration (pg/mL:      <50             Positive            >450                    Gray                 300-450                    Negative             <300    50-75           Positive            >900                  Gray                300-900                  Negative            <300      >75             Positive            >1800                  Gray                300-1800                  Negative            <300    Magnesium [[id number]]  (Normal) Collected: 01/14/25 2358    Specimen: Blood Updated: 01/15/25 0029     Magnesium 1.9 mg/dL     CBC Auto Differential [[id number]]  (Normal) Collected: 01/14/25 2358    Specimen: Blood Updated: 01/15/25 0012     WBC 9.49 10*3/mm3      RBC 4.51 10*6/mm3      Hemoglobin 12.8 g/dL      Hematocrit 39.4 %      MCV 87.4 fL      MCH 28.4 pg      MCHC 32.5 g/dL      RDW 12.7 %      RDW-SD 40.9 fl      MPV 10.4 fL      Platelets 318 10*3/mm3      Neutrophil % 67.7 %      Lymphocyte % 23.1 %      Monocyte % 6.1 %      Eosinophil % 1.9 %      Basophil % 0.8 %      Immature Grans % 0.4 %      Neutrophils, Absolute 6.42 10*3/mm3      Lymphocytes, Absolute 2.19 10*3/mm3      Monocytes, Absolute 0.58 10*3/mm3      Eosinophils, Absolute 0.18 10*3/mm3      Basophils, Absolute 0.08 10*3/mm3      Immature Grans, Absolute 0.04 10*3/mm3      nRBC 0.0 /100 WBC     D-dimer, Quantitative [[id number]]  (Normal) Collected: 01/14/25 2358    Specimen: Blood Updated: 01/15/25 0216     D-Dimer, Quantitative 0.53 MCGFEU/mL     Narrative:      According to the assay 's published package insert, a normal (<0.50 MCGFEU/mL) D-dimer result in conjunction with a non-high clinical probability assessment, excludes deep vein thrombosis (DVT) and pulmonary embolism (PE) with high sensitivity.    D-dimer values increase with age and this can make VTE exclusion of an older population difficult. To address this, the [employer], based on best available evidence and recent guidelines, recommends that clinicians use age-adjusted D-dimer thresholds in patients  "greater than 50 years of age with: a) a low probability of PE who do not meet all Pulmonary Embolism Rule Out Criteria, or b) in those with intermediate probability of PE.   The formula for an age-adjusted D-dimer cut-off is \"age/100\".  For example, a 60 year old patient would have an age-adjusted cut-off of 0.60 MCGFEU/mL and an 80 year old 0.80 MCGFEU/mL.    High Sensitivity Troponin T 1Hr [[id number]]  (Normal) Collected: 01/15/25 0141    Specimen: Blood Updated: 01/15/25 0207     HS Troponin T 9 ng/L      Troponin T Numeric Delta 1 ng/L     Narrative:      High Sensitive Troponin T Reference Range:  <14.0 ng/L- Negative Female for AMI  <22.0 ng/L- Negative Male for AMI  >=14 - Abnormal Female indicating possible myocardial injury.  >=22 - Abnormal Male indicating possible myocardial injury.   Clinicians would have to utilize clinical acumen, EKG, Troponin, and serial changes to determine if it is an Acute Myocardial Infarction or myocardial injury due to an underlying chronic condition.                  Imaging:    XR Chest 1 View    Result Date: 1/15/2025  XR CHEST 1 VW-  Date of exam: 1/15/2025, 12:12 a.m.  Indication: Chest pain.  Comparison: 7/5/2024.  FINDINGS: A single frontal (AP or PA upright portable) chest radiograph reveals no cardiac enlargement. There may be new mild pulmonary edema with vascular congestion. No focal (lobar or dense) infiltrate is seen. The thoracic aorta is ectatic and atherosclerotic. No pneumothorax. No pleural effusion. External artifacts are noted. Degenerative changes involve the spine and shoulders. There may be mild thoracolumbar scoliosis.       There may be new mild pulmonary edema with vascular congestion. No cardiac enlargement. No pleural effusion. Please see above comments for further detail.    Portions of this note were completed with a voice recognition program.  Electronically Signed By-Oscar Solorzano MD On:1/15/2025 12:20 AM         Differential Diagnosis and " Discussion:    Chest Pain:  Based on the patient's signs and symptoms, I considered aortic dissection, myocardial infaction, pulmonary embolism, cardiac tamponade, pericarditis, pneumothorax, musculoskeletal chest pain and other differential diagnosis as an etiology of the patient's chest pain.     PROCEDURES:    Labs were collected in the emergency department and all labs were reviewed and interpreted by me.  X-ray were performed in the emergency department and all X-ray impressions were independently interpreted by me.  An EKG was performed and the EKG was interpreted by me.    ECG 12 Lead ED Triage Standing Order; Chest Pain   Preliminary Result   HEART RATE=82  bpm   RR Nprjjnqp=667  ms   ME Xfpidass=895  ms   P Horizontal Axis=-28  deg   P Front Axis=26  deg   QRSD Interval=87  ms   QT Zlxfeafd=662  ms   HJqM=015  ms   QRS Axis=36  deg   T Wave Axis=68  deg   - NORMAL ECG -   Sinus rhythm   Date and Time of Study:2025-01-15 00:00:28          Procedures    MDM  Number of Diagnoses or Management Options  Diagnosis management comments: Patient is afebrile nontoxic-appearing.  Vital signs are stable.  EKG showed sinus rhythm no acute ST elevation.  Patient does have chest pain over anterior chest that is reproducible with palpitation.  Presentation more consistent with musculoskeletal pain.  Patient's troponin was negative.  D-dimer was also negative.  Chest x-ray showed mild edema.  Discussed this with patient and recommend that she follows up closely with her primary care physician for further workup.  BNP is within normal range.  Discussed return precautions, discharge instructions and answered all her questions.       Amount and/or Complexity of Data Reviewed  Clinical lab tests: reviewed  Tests in the radiology section of CPT®: reviewed  Review and summarize past medical records: yes  Independent visualization of images, tracings, or specimens: yes    Risk of Complications, Morbidity, and/or  Mortality  Presenting problems: moderate  Management options: moderate                       Patient Care Considerations:    SEPSIS was considered but is NOT present in the emergency department as SIRS criteria is not present.      Consultants/Shared Management Plan:    None    Social Determinants of Health:    Patient is independent, reliable, and has access to care.       Disposition and Care Coordination:    Discharged: The patient is suitable and stable for discharge with no need for consideration of admission.    I have explained the patient´s condition, diagnoses and treatment plan based on the information available to me at this time. I have answered questions and addressed any concerns. The patient has a good  understanding of the patient´s diagnosis, condition, and treatment plan as can be expected at this point. The vital signs have been stable. The patient´s condition is stable and appropriate for discharge from the emergency department.      The patient will pursue further outpatient evaluation with the primary care physician or other designated or consulting physician as outlined in the discharge instructions. They are agreeable to this plan of care and follow-up instructions have been explained in detail. The patient has received these instructions in written format and has expressed an understanding of the discharge instructions. The patient is aware that any significant change in condition or worsening of symptoms should prompt an immediate return to this or the closest emergency department or call to 911.  I have explained discharge medications and the need for follow up with the patient/caretakers. This was also printed in the discharge instructions. Patient was discharged with the following medications and follow up:      Medication List      No changes were made to your prescriptions during this visit.      Nathalie Dolan MD  [address] [place] [id number]  [phone]    In 2  Paul Dumont MD  [address] [place] [id number]  [phone]             Final diagnoses:   Chest pain, unspecified type        ED Disposition       ED Disposition   Discharge    Condition   Stable    Comment   --               This medical record created using voice recognition software.             Anabelle De La Torre MD  01/15/25 0236

## 2025-01-15 NOTE — Clinical Note
[facility] EMERGENCY ROOM  [address] [place] [address]  Phone: [phone]  Fax: [phone]    Ralf Zamorano accompanied Nia SCALES to the emergency department on 1/14/2025. They may return to work on 01/16/2025.        Thank you for choosing [facility].    Anabelle De La Torre MD

## 2025-01-16 LAB
QT INTERVAL: 397 MS
QTC INTERVAL: 463 MS